# Patient Record
Sex: FEMALE | Race: WHITE | Employment: FULL TIME | URBAN - METROPOLITAN AREA
[De-identification: names, ages, dates, MRNs, and addresses within clinical notes are randomized per-mention and may not be internally consistent; named-entity substitution may affect disease eponyms.]

---

## 2019-09-17 ENCOUNTER — HOSPITAL ENCOUNTER (OUTPATIENT)
Dept: INTERVENTIONAL RADIOLOGY/VASCULAR | Age: 59
Discharge: HOME | End: 2019-09-17
Attending: RADIOLOGY | Admitting: RADIOLOGY
Payer: COMMERCIAL

## 2019-09-17 DIAGNOSIS — M54.2 NECK PAIN: ICD-10-CM

## 2019-09-17 DIAGNOSIS — M51.36 DDD (DEGENERATIVE DISC DISEASE), LUMBAR: ICD-10-CM

## 2019-09-17 DIAGNOSIS — M54.12 CERVICAL RADICULOPATHY: ICD-10-CM

## 2019-09-17 DIAGNOSIS — M54.16 LUMBAR RADICULOPATHY: ICD-10-CM

## 2019-09-17 DIAGNOSIS — M48.061 STENOSIS, SPINAL, LUMBAR: ICD-10-CM

## 2019-09-17 PROCEDURE — 74011250636 HC RX REV CODE- 250/636: Performed by: RADIOLOGY

## 2019-09-17 PROCEDURE — 62323 NJX INTERLAMINAR LMBR/SAC: CPT

## 2019-09-17 PROCEDURE — 74011000250 HC RX REV CODE- 250

## 2019-09-17 PROCEDURE — A9575 INJ GADOTERATE MEGLUMI 0.1ML: HCPCS | Performed by: RADIOLOGY

## 2019-09-17 PROCEDURE — 64483 NJX AA&/STRD TFRM EPI L/S 1: CPT

## 2019-09-17 PROCEDURE — 74011000250 HC RX REV CODE- 250: Performed by: RADIOLOGY

## 2019-09-17 RX ORDER — LIDOCAINE HYDROCHLORIDE 10 MG/ML
INJECTION, SOLUTION EPIDURAL; INFILTRATION; INTRACAUDAL; PERINEURAL
Status: DISCONTINUED
Start: 2019-09-17 | End: 2019-09-17 | Stop reason: HOSPADM

## 2019-09-17 RX ORDER — DEXAMETHASONE SODIUM PHOSPHATE 10 MG/ML
10 INJECTION INTRAMUSCULAR; INTRAVENOUS ONCE
Status: DISCONTINUED | OUTPATIENT
Start: 2019-09-17 | End: 2019-09-17 | Stop reason: HOSPADM

## 2019-09-17 RX ORDER — LIDOCAINE HYDROCHLORIDE 10 MG/ML
10 INJECTION, SOLUTION EPIDURAL; INFILTRATION; INTRACAUDAL; PERINEURAL ONCE
Status: COMPLETED | OUTPATIENT
Start: 2019-09-17 | End: 2019-09-17

## 2019-09-17 RX ORDER — SODIUM CHLORIDE 9 MG/ML
INJECTION INTRAMUSCULAR; INTRAVENOUS; SUBCUTANEOUS
Status: COMPLETED
Start: 2019-09-17 | End: 2019-09-17

## 2019-09-17 RX ORDER — GADOTERATE MEGLUMINE 376.9 MG/ML
10 INJECTION INTRAVENOUS
Status: COMPLETED | OUTPATIENT
Start: 2019-09-17 | End: 2019-09-17

## 2019-09-17 RX ORDER — DEXAMETHASONE SODIUM PHOSPHATE 10 MG/ML
10 INJECTION INTRAMUSCULAR; INTRAVENOUS ONCE
Status: COMPLETED | OUTPATIENT
Start: 2019-09-17 | End: 2019-09-17

## 2019-09-17 RX ADMIN — LIDOCAINE HYDROCHLORIDE 10 ML: 10 INJECTION, SOLUTION EPIDURAL; INFILTRATION; INTRACAUDAL; PERINEURAL at 08:10

## 2019-09-17 RX ADMIN — DEXAMETHASONE SODIUM PHOSPHATE 10 MG: 10 INJECTION INTRAMUSCULAR; INTRAVENOUS at 08:11

## 2019-09-17 RX ADMIN — GADOTERATE MEGLUMINE 10 ML: 376.9 INJECTION INTRAVENOUS at 08:10

## 2019-09-17 RX ADMIN — SODIUM CHLORIDE 10 ML: 9 INJECTION INTRAMUSCULAR; INTRAVENOUS; SUBCUTANEOUS at 08:10

## 2019-09-17 NOTE — DISCHARGE INSTRUCTIONS
[address] Steroid Injection Discharge Instructions    General Information:   A steroid injection was performed today, placing a combination of a steroid and an anesthetic (numbing medicine) into the space around the nerves of your spine. This is done to treat back pain. It may take 7-10 days for the injection to reach its full potential.  This procedure can be done at any level of the spinal column, depending on where your pain is. Your doctor will have ordered the appropriate level to be treated prior to your coming in for the procedure. Home Care Instructions: You can resume your regular diet. Do not drink alcohol. You may notice that you have to use your pain medications less after your injection. Some people do not notice much of a change in their pain after the first injection. If that is the case, it is worth your time to have a second one done. This is why these injections are sometimes ordered in a series of three. Keep the puncture site clean and dry for 24 hours, and then you may remove the dressing. Showering is acceptable after the bandage is removed. Call If:   You should call your Physician and/or the Radiology Nurse if you have any bleeding other than a small spot on your bandage. Call if you have any signs of infection, fever, increased pain at the puncture site, confusion, or a headache that worsens when you stand and eases when lying flat. Follow-Up Instructions:  Please see your ordering doctor as he/she has requested. Let your doctor know if you have relief from your pain so they may schedule another injection for you if it is indicated. To Reach Us:  Side effects of sedation medications and other medications used today have been reviewed. Notify us of nausea, itching, hives, dizziness, or anything else out of the ordinary.        Should you experience any of these significant changes, please call [phone] between the hours of 7:30 am and 10 pm or [phone] after hours.  After hours, ask the  to page the [address] Technologist, and describe the problem to the technologist.           Patient Signature:  Date: 9/17/2019  Discharging Nurse: Kelsey Daniels RN

## 2019-12-12 ENCOUNTER — HOSPITAL ENCOUNTER (OUTPATIENT)
Dept: MRI IMAGING | Age: 59
Discharge: HOME | End: 2019-12-12
Attending: ORTHOPAEDIC SURGERY
Payer: COMMERCIAL

## 2019-12-12 DIAGNOSIS — M48.061 LUMBAR STENOSIS: ICD-10-CM

## 2019-12-12 DIAGNOSIS — M54.16 LUMBAR RADICULOPATHY: ICD-10-CM

## 2019-12-12 PROCEDURE — 72148 MRI LUMBAR SPINE W/O DYE: CPT

## 2020-02-13 ENCOUNTER — HOSPITAL ENCOUNTER (OUTPATIENT)
Dept: INTERVENTIONAL RADIOLOGY/VASCULAR | Age: 60
Discharge: HOME | End: 2020-02-13
Attending: RADIOLOGY | Admitting: RADIOLOGY
Payer: COMMERCIAL

## 2020-02-13 DIAGNOSIS — M54.12 CERVICAL RADICULOPATHY: ICD-10-CM

## 2020-02-13 DIAGNOSIS — M54.16 LUMBAR RADICULOPATHY: ICD-10-CM

## 2020-02-13 DIAGNOSIS — M51.36 LUMBAR DEGENERATIVE DISC DISEASE: ICD-10-CM

## 2020-02-13 DIAGNOSIS — M48.061 LUMBAR STENOSIS: ICD-10-CM

## 2020-02-13 DIAGNOSIS — M50.30 DDD (DEGENERATIVE DISC DISEASE), CERVICAL: ICD-10-CM

## 2020-02-13 DIAGNOSIS — M54.2 NECK PAIN ON RIGHT SIDE: ICD-10-CM

## 2020-02-13 PROCEDURE — 64483 NJX AA&/STRD TFRM EPI L/S 1: CPT

## 2020-02-13 PROCEDURE — 74011636320 HC RX REV CODE- 636/320: Performed by: RADIOLOGY

## 2020-02-13 PROCEDURE — A9585 GADOBUTROL INJECTION: HCPCS | Performed by: RADIOLOGY

## 2020-02-13 PROCEDURE — 74011000250 HC RX REV CODE- 250: Performed by: RADIOLOGY

## 2020-02-13 PROCEDURE — 74011250636 HC RX REV CODE- 250/636: Performed by: RADIOLOGY

## 2020-02-13 RX ORDER — LIDOCAINE HYDROCHLORIDE 10 MG/ML
10 INJECTION, SOLUTION EPIDURAL; INFILTRATION; INTRACAUDAL; PERINEURAL
Status: COMPLETED | OUTPATIENT
Start: 2020-02-13 | End: 2020-02-13

## 2020-02-13 RX ORDER — DEXAMETHASONE SODIUM PHOSPHATE 10 MG/ML
10 INJECTION INTRAMUSCULAR; INTRAVENOUS ONCE
Status: COMPLETED | OUTPATIENT
Start: 2020-02-13 | End: 2020-02-13

## 2020-02-13 RX ORDER — SODIUM CHLORIDE 9 MG/ML
3 INJECTION INTRAMUSCULAR; INTRAVENOUS; SUBCUTANEOUS
Status: COMPLETED | OUTPATIENT
Start: 2020-02-13 | End: 2020-02-13

## 2020-02-13 RX ADMIN — LIDOCAINE HYDROCHLORIDE 5 ML: 10 INJECTION, SOLUTION EPIDURAL; INFILTRATION; INTRACAUDAL; PERINEURAL at 08:11

## 2020-02-13 RX ADMIN — SODIUM CHLORIDE 2 ML: 9 INJECTION INTRAMUSCULAR; INTRAVENOUS; SUBCUTANEOUS at 08:28

## 2020-02-13 RX ADMIN — IOHEXOL 1 ML: 180 INJECTION INTRAVENOUS at 08:28

## 2020-02-13 RX ADMIN — DEXAMETHASONE SODIUM PHOSPHATE 10 MG: 10 INJECTION, SOLUTION INTRAMUSCULAR; INTRAVENOUS at 08:10

## 2021-11-19 DIAGNOSIS — M76.62 TENDONITIS, ACHILLES, LEFT: Primary | ICD-10-CM

## 2021-11-22 DIAGNOSIS — M76.61 RIGHT ACHILLES TENDINITIS: Primary | ICD-10-CM

## 2021-12-06 ENCOUNTER — OFFICE VISIT (OUTPATIENT)
Dept: ORTHOPEDIC SURGERY | Age: 61
End: 2021-12-06
Payer: COMMERCIAL

## 2021-12-06 DIAGNOSIS — M76.60 INSERTIONAL ACHILLES TENDINOPATHY: Primary | ICD-10-CM

## 2021-12-06 PROCEDURE — 99213 OFFICE O/P EST LOW 20 MIN: CPT | Performed by: ORTHOPAEDIC SURGERY

## 2021-12-06 NOTE — PROGRESS NOTES
Moshe Garcia (: 1960) is a 63 y.o. female, patient, here for evaluation of the following chief complaint(s): Foot Pain (right foot pain)       HPI:    Patient returns to the office with recurrent discomfort of the right ankle. She is now status post MRI. Allergies   Allergen Reactions    Iodine Anaphylaxis    Adhesive Rash and Itching     BANDAIDS    Morphine Rash and Itching     spoke with pt who explained morphine produced red splotches all over not hives; ok w/ it again if needed as well as other opioids; no complications w/ Lortab, Percocet, etc.       Current Outpatient Medications   Medication Sig    estradiol (ESTRACE) 2 mg tablet Take  by mouth daily.  cyclobenzaprine (FLEXERIL) 10 mg tablet Take  by mouth three (3) times daily as needed for Muscle Spasm(s).  cetirizine (ZYRTEC) 10 mg tablet Take  by mouth daily.  oxyCODONE-acetaminophen (PERCOCET) 5-325 mg per tablet Take 1-2 Tabs by mouth every four (4) hours as needed for Pain. No current facility-administered medications for this visit.        Past Medical History:   Diagnosis Date    Arthritis     OSTEO    Chronic pain     DJD LUMBAR; SCIATICA, PAIN IN BUTTOCK ON LEFT OR RIGHT AT DIFFERENT TIMES    IBS (irritable bowel syndrome)     Mitral valve prolapse     Nausea & vomiting     RECENT PRE-OP ANTI-EMETIC EFFECTIVE        Past Surgical History:   Procedure Laterality Date    HX DILATION AND CURETTAGE      HX HEENT  13YO    STRABISMUS REPAIR    HX HYSTERECTOMY      HX ORTHOPAEDIC  13    KNEE ARTHROSCOPY LEFT    HX ORTHOPAEDIC      STAPLE IN L SHOULDER    IR INJ FORAMIN EPID LUMB ANES/STER SNGL  2020    IR INJ SPINE THER SUBST LUM/SAC W IMG  2019       Family History   Problem Relation Age of Onset    Heart Surgery Mother     Arthritis-rheumatoid Mother     Cancer Father         LUNG - SMOKER    Stroke Brother 36        CEREBRAL ANEURYSM    Other Brother         PSORIATIC ARTHRITIS    Arthritis-rheumatoid Maternal Aunt         Social History     Socioeconomic History    Marital status: SINGLE     Spouse name: Not on file    Number of children: Not on file    Years of education: Not on file    Highest education level: Not on file   Occupational History    Not on file   Tobacco Use    Smoking status: Never Smoker    Smokeless tobacco: Not on file   Substance and Sexual Activity    Alcohol use: Yes     Alcohol/week: 0.8 standard drinks     Types: 1 Glasses of wine per week    Drug use: Not on file    Sexual activity: Not on file   Other Topics Concern    Not on file   Social History Narrative    Not on file     Social Determinants of Health     Financial Resource Strain:     Difficulty of Paying Living Expenses: Not on file   Food Insecurity:     Worried About Running Out of Food in the Last Year: Not on file    Elizabeth of Food in the Last Year: Not on file   Transportation Needs:     Lack of Transportation (Medical): Not on file    Lack of Transportation (Non-Medical):  Not on file   Physical Activity:     Days of Exercise per Week: Not on file    Minutes of Exercise per Session: Not on file   Stress:     Feeling of Stress : Not on file   Social Connections:     Frequency of Communication with Friends and Family: Not on file    Frequency of Social Gatherings with Friends and Family: Not on file    Attends Cheondoism Services: Not on file    Active Member of [address] [place] [address] [employer] or Organizations: Not on file    Attends Club or Organization Meetings: Not on file    Marital Status: Not on file   Intimate Partner Violence:     Fear of Current or Ex-Partner: Not on file    Emotionally Abused: Not on file    Physically Abused: Not on file    Sexually Abused: Not on file   Housing Stability:     Unable to Pay for Housing in the Last Year: Not on file    Number of [place] in the Last Year: Not on file    Unstable Housing in the Last Year: Not on file       Review of Systems Musculoskeletal:        Right foot pain       Vitals: There were no vitals taken for this visit. There is no height or weight on file to calculate BMI. Ortho Exam     Right ankle: Patient has mild swelling along the insertion site of the Achilles tendon with pain to palpation at the site. Patient has full range of motion of the ankle and subtalar joint. There is no instability. Neurovascular examination is intact. MRI evaluation shows degenerative changes noted at the insertion site of the Achilles with evidence of partial thickness tearing along the deep portion of the Achilles    ASSESSMENT/PLAN:    I gone over the findings. She has had multiple conservative options moving forward. She is leaning more towards more aggressive options. Therefore I would recommend a referral line to Dr. Gurpreet Davis. Patient will have the MRI available and I have made the referral.  She is to follow-up as needed.         Billye Blizzard, MD

## 2022-06-29 ENCOUNTER — OFFICE VISIT (OUTPATIENT)
Dept: ORTHOPEDIC SURGERY | Age: 62
End: 2022-06-29
Payer: COMMERCIAL

## 2022-06-29 VITALS — HEIGHT: 72 IN | WEIGHT: 215 LBS | BODY MASS INDEX: 29.12 KG/M2

## 2022-06-29 DIAGNOSIS — M17.12 PRIMARY OSTEOARTHRITIS OF LEFT KNEE: ICD-10-CM

## 2022-06-29 DIAGNOSIS — M25.562 LEFT KNEE PAIN, UNSPECIFIED CHRONICITY: Primary | ICD-10-CM

## 2022-06-29 PROCEDURE — 20610 DRAIN/INJ JOINT/BURSA W/O US: CPT | Performed by: ORTHOPAEDIC SURGERY

## 2022-06-29 PROCEDURE — 99214 OFFICE O/P EST MOD 30 MIN: CPT | Performed by: ORTHOPAEDIC SURGERY

## 2022-06-29 NOTE — LETTER
6/29/2022    Patient: Jayy Avila   YOB: 1960   Date of Visit: 6/29/2022     Gordon Felipe MD  [place] [address] [address]  Via Fax: [phone]    Dear Gordon Felipe MD,      Thank you for referring Ms. Jasmina Parmar to [facility] for evaluation. My notes for this consultation are attached. If you have questions, please do not hesitate to call me. I look forward to following your patient along with you.       Sincerely,    Jj Hardwick MD

## 2022-06-29 NOTE — PROGRESS NOTES
Jamir Lundberg (: 1960) is a 63 y.o. female, patient, here for evaluation of the following chief complaint(s):  Knee Pain (left knee pain)       HPI:    Patient returns to the office today with with a chief plaint of left knee pain. This is a patient who has had on and off left knee pain. Her pain is gotten a little bit worse. She describes swelling in the knee. She notices snapping popping of the knee. She did have a prior opening wedge osteotomy performed on her distal femur on the date of 2012. She does not report any recent level of trauma. Allergies   Allergen Reactions    Iodine Anaphylaxis    Adhesive Rash and Itching     BANDAIDS    Morphine Rash and Itching     spoke with pt who explained morphine produced red splotches all over not hives; ok w/ it again if needed as well as other opioids; no complications w/ Lortab, Percocet, etc.       Current Outpatient Medications   Medication Sig    estradiol (ESTRACE) 2 mg tablet Take  by mouth daily.  cyclobenzaprine (FLEXERIL) 10 mg tablet Take  by mouth three (3) times daily as needed for Muscle Spasm(s).  cetirizine (ZYRTEC) 10 mg tablet Take  by mouth daily.  oxyCODONE-acetaminophen (PERCOCET) 5-325 mg per tablet Take 1-2 Tabs by mouth every four (4) hours as needed for Pain. No current facility-administered medications for this visit.        Past Medical History:   Diagnosis Date    Arthritis     OSTEO    Chronic pain     DJD LUMBAR; SCIATICA, PAIN IN BUTTOCK ON LEFT OR RIGHT AT DIFFERENT TIMES    IBS (irritable bowel syndrome)     Mitral valve prolapse     Nausea & vomiting     RECENT PRE-OP ANTI-EMETIC EFFECTIVE        Past Surgical History:   Procedure Laterality Date    HX DILATION AND CURETTAGE      HX HEENT  13YO    STRABISMUS REPAIR    HX HYSTERECTOMY      HX ORTHOPAEDIC  13    KNEE ARTHROSCOPY LEFT    HX ORTHOPAEDIC      STAPLE IN L SHOULDER    IR INJ FORAMIN EPID LUMB ANES/STER SNGL 2/13/2020    IR INJ SPINE THER SUBST LUM/SAC W IMG  9/17/2019       Family History   Problem Relation Age of Onset    Heart Surgery Mother     Arthritis-rheumatoid Mother     Cancer Father         LUNG - SMOKER    Stroke Brother 36        CEREBRAL ANEURYSM    Other Brother         PSORIATIC ARTHRITIS    Arthritis-rheumatoid Maternal Aunt         Social History     Socioeconomic History    Marital status: SINGLE     Spouse name: Not on file    Number of children: Not on file    Years of education: Not on file    Highest education level: Not on file   Occupational History    Not on file   Tobacco Use    Smoking status: Never Smoker    Smokeless tobacco: Not on file   Substance and Sexual Activity    Alcohol use: Yes     Alcohol/week: 0.8 standard drinks     Types: 1 Glasses of wine per week    Drug use: Not on file    Sexual activity: Not on file   Other Topics Concern    Not on file   Social History Narrative    Not on file     Social Determinants of Health     Financial Resource Strain:     Difficulty of Paying Living Expenses: Not on file   Food Insecurity:     Worried About Running Out of Food in the Last Year: Not on file    Elizabeth of Food in the Last Year: Not on file   Transportation Needs:     Lack of Transportation (Medical): Not on file    Lack of Transportation (Non-Medical):  Not on file   Physical Activity:     Days of Exercise per Week: Not on file    Minutes of Exercise per Session: Not on file   Stress:     Feeling of Stress : Not on file   Social Connections:     Frequency of Communication with Friends and Family: Not on file    Frequency of Social Gatherings with Friends and Family: Not on file    Attends Confucianist Services: Not on file    Active Member of Clubs or Organizations: Not on file    Attends Club or Organization Meetings: Not on file    Marital Status: Not on file   Intimate Partner Violence:     Fear of Current or Ex-Partner: Not on file   [employer] Abused: Not on file    Physically Abused: Not on file    Sexually Abused: Not on file   Housing Stability:     Unable to Pay for Housing in the Last Year: Not on file    Number of Places Lived in the Last Year: Not on file    Unstable Housing in the Last Year: Not on file       Review of Systems   Musculoskeletal:        Left knee pain       Vitals:  Ht 6' 3\" (1.905 m)   Wt 215 lb (97.5 kg)   BMI 26.87 kg/m²    Body mass index is 26.87 kg/m². Ortho Exam     Patient is alert and oriented x3. Patient is in no acute distress. Patient ambulates with a nonantalgic gait. Left knee: Prior incision is healed. She has moderate effusion. Range of motion 0-135 degrees. She has crepitation along the patellofemoral joint as well as lateral joint space. She has tenderness noted along the lateral joint space. No medial joint tenderness. Collateral ligaments are intact. Anterior drawer and posterior drawer negative. There is no swelling noted distally. Neurovascular examination is intact. Right knee: There is no abrasions, lacerations, ecchymosis or soft tissue swelling. No effusion is identified. There is no pain to palpation along the medial or lateral border of the patella. There is no pain or crepitation with manipulation of the patella. There is normal excursion of the patella. Patellar grind test is negative. Active and passive range of motion is full and does not cause pain or crepitation. There is no pain with palpation along the medial femoral epicondyle or medial tibia and no pain with palpation over the lateral femoral epicondyle. There is no medial or lateral joint line tenderness. Julita's maneuver is negative. There is no collateral ligament instability. Anterior drawer, Lachman and posterior drawer are negative. There is no soft tissue swelling distally into the leg. Neurocirculatory examination is intact.       XR Results (most recent):  Results from Appointment encounter on 06/29/22    XR KNEE LT MIN 4 V    Narrative  5 view x-ray of the left knee reveals bone-on-bone arthritis lateral compartment of the left knee. She has retained hardware from prior osteotomy. Moderate osteoarthritis of the patellofemoral joint is also noted. ASSESSMENT/PLAN:    I gone over the findings with the patient. Patient has developed worsening osteoarthritis and now bone-on-bone arthritis lateral compartment of the left knee. We have talked about treatment options moving forward. We did talk about converting to a total knee replacement. We also talked about a partial knee replacement. I think a total knee replacement would be 1 and done as well compared to a partial knee replacement and very well may provide some level of improvement but over time she most likely will have a third surgery to convert this she was given this some consideration. However at this stage she would like to continue with nonoperative treatment. We have decided to proceed with aspiration injection of the knee. I think this is appropriate and reasonable. If she has no improvement with aspiration injection, we may need to consider more aggressive options later. Consent for the injection was obtained. Risk of postinjection infection, lack of improvement, hypopigmentation and unusual allergic reaction were explained to the patient. After consent, the skin was sterilely prepped and 80 mg of triamcinolone and 5 cc of 0.25% plain Marcaine was was injected in the left knee. Knee was aspirated approximately 50 cc of sterile fluid patient had no complications. Patient is to ice modify activities for 24 hours.   Patient is to return to the office if no improvement        Nallely Love MD

## 2022-09-28 ENCOUNTER — OFFICE VISIT (OUTPATIENT)
Dept: ORTHOPEDIC SURGERY | Age: 62
End: 2022-09-28
Payer: COMMERCIAL

## 2022-09-28 VITALS — BODY MASS INDEX: 27.9 KG/M2 | HEIGHT: 72 IN | WEIGHT: 206 LBS

## 2022-09-28 DIAGNOSIS — M25.562 LEFT KNEE PAIN, UNSPECIFIED CHRONICITY: Primary | ICD-10-CM

## 2022-09-28 DIAGNOSIS — M17.12 PRIMARY OSTEOARTHRITIS OF LEFT KNEE: ICD-10-CM

## 2022-09-28 PROBLEM — R20.9 SKIN SENSATION DISTURBANCE: Status: ACTIVE | Noted: 2022-09-28

## 2022-09-28 PROBLEM — M95.0 NASAL DEVIATION: Status: ACTIVE | Noted: 2022-09-28

## 2022-09-28 PROBLEM — M67.88 ACHILLES TENDINOSIS OF RIGHT LOWER EXTREMITY: Status: ACTIVE | Noted: 2022-01-07

## 2022-09-28 PROBLEM — R03.0 ELEVATED BLOOD-PRESSURE READING WITHOUT DIAGNOSIS OF HYPERTENSION: Status: ACTIVE | Noted: 2022-09-28

## 2022-09-28 PROBLEM — J32.9 CHRONIC SINUSITIS: Status: ACTIVE | Noted: 2022-09-28

## 2022-09-28 PROBLEM — M19.019 LOCALIZED, PRIMARY OSTEOARTHRITIS OF SHOULDER REGION: Status: ACTIVE | Noted: 2022-09-28

## 2022-09-28 PROBLEM — G57.00 SCIATIC NERVE LESION: Status: ACTIVE | Noted: 2022-09-28

## 2022-09-28 PROBLEM — K58.9 IRRITABLE BOWEL SYNDROME: Status: ACTIVE | Noted: 2022-09-28

## 2022-09-28 PROBLEM — I10 BENIGN ESSENTIAL HYPERTENSION: Status: ACTIVE | Noted: 2022-09-28

## 2022-09-28 PROBLEM — I05.9 MITRAL VALVE DISORDER: Status: ACTIVE | Noted: 2022-09-28

## 2022-09-28 PROBLEM — R43.0 LOSS OF SMELL: Status: ACTIVE | Noted: 2022-09-28

## 2022-09-28 PROBLEM — J30.2 SEASONAL ALLERGIES: Status: ACTIVE | Noted: 2022-09-28

## 2022-09-28 PROBLEM — M17.9 OSTEOARTHRITIS OF KNEE: Status: ACTIVE | Noted: 2022-09-28

## 2022-09-28 PROBLEM — R06.83 SNORING: Status: ACTIVE | Noted: 2022-09-28

## 2022-09-28 PROBLEM — M47.816 LUMBAR SPONDYLOSIS: Status: ACTIVE | Noted: 2022-09-28

## 2022-09-28 PROBLEM — F41.1 GENERALIZED ANXIETY DISORDER: Status: ACTIVE | Noted: 2022-09-28

## 2022-09-28 PROCEDURE — 99203 OFFICE O/P NEW LOW 30 MIN: CPT | Performed by: ORTHOPAEDIC SURGERY

## 2022-09-28 RX ORDER — ASPIRIN 325 MG
325 TABLET ORAL DAILY
COMMUNITY

## 2022-09-28 RX ORDER — DULOXETIN HYDROCHLORIDE 30 MG/1
30 CAPSULE, DELAYED RELEASE ORAL DAILY
COMMUNITY

## 2022-09-28 RX ORDER — LOSARTAN POTASSIUM 50 MG/1
TABLET ORAL
COMMUNITY
Start: 2022-09-12

## 2022-09-28 RX ORDER — DICLOFENAC SODIUM 10 MG/G
GEL TOPICAL
COMMUNITY

## 2022-09-28 RX ORDER — ESTRADIOL 1 MG/1
TABLET ORAL
COMMUNITY
Start: 2022-09-25

## 2022-09-28 NOTE — PATIENT INSTRUCTIONS
Knee Arthritis: Care Instructions  Your Care Instructions     Knee arthritis is a breakdown of the cartilage that cushions your knee joint. When the cartilage wears down, your bones rub against each other. This causes pain and stiffness. Knee arthritis tends to get worse with time. Treatment for knee arthritis involves reducing pain, making the leg muscles stronger, and staying at a healthy body weight. The treatment usually does not improve the health of the cartilage, but it can reduce pain and improve how well your knee works. You can take simple measures to protect your knee joints, ease your pain, and help you stay active. Follow-up care is a key part of your treatment and safety. Be sure to make and go to all appointments, and call your doctor if you are having problems. It's also a good idea to know your test results and keep a list of the medicines you take. How can you care for yourself at home? Know that knee arthritis will cause more pain on some days than on others. Stay at a healthy weight. Lose weight if you are overweight. When you stand up, the pressure on your knees from every pound of body weight is multiplied four times. So if you lose 10 pounds, you will reduce the pressure on your knees by 40 pounds. Talk to your doctor or physical therapist about exercises that will help ease joint pain. Stretch to help prevent stiffness and to prevent injury before you exercise. You may enjoy gentle forms of yoga to help keep your knee joints and muscles flexible. Walk instead of jog. Ride a bike. This makes your thigh muscles stronger and takes pressure off your knee. Wear well-fitting and comfortable shoes. Exercise in chest-deep water. This can help you exercise longer with less pain. Avoid exercises that include squatting or kneeling. They can put a lot of strain on your knees. Talk to your doctor to make sure that the exercise you do is not making the arthritis worse.   Do not sit for long periods of time. Try to walk once in a while to keep your knee from getting stiff. Ask your doctor or physical therapist whether shoe inserts may reduce your arthritis pain. If you can afford it, get new athletic shoes at least every year. This can help reduce the strain on your knees. Use a device to help you do everyday activities. A cane or walking stick can help you keep your balance when you walk. Hold the cane or walking stick in the hand opposite the painful knee. If you feel like you may fall when you walk, try using crutches or a front-wheeled walker. These can prevent falls that could cause more damage to your knee. A knee brace may help keep your knee stable and prevent pain. You also can use other things to make life easier, such as a higher toilet seat and handrails in the bathtub or shower. Take pain medicines exactly as directed. Do not wait until you are in severe pain. You will get better results if you take it sooner. If you are not taking a prescription pain medicine, take an over-the-counter medicine such as acetaminophen (Tylenol), ibuprofen (Advil, Motrin), or naproxen (Aleve). Read and follow all instructions on the label. Do not take two or more pain medicines at the same time unless the doctor told you to. Many pain medicines have acetaminophen, which is Tylenol. Too much acetaminophen (Tylenol) can be harmful. Tell your doctor if you take a blood thinner, have diabetes, or have allergies to shellfish. Ask your doctor if you might benefit from a shot of steroid medicine into your knee. This may provide pain relief for several months. Many people take the supplements glucosamine and chondroitin for osteoarthritis. Some people feel they help, but the medical research does not show that they work. Talk to your doctor before you take these supplements. When should you call for help?    Call your doctor now or seek immediate medical care if:    You have sudden swelling, warmth, or pain in your knee. You have knee pain and a fever or rash. You have such bad pain that you cannot use your knee. Watch closely for changes in your health, and be sure to contact your doctor if you have any problems. Where can you learn more? Go to http://www.gray.com/  Enter W187 in the search box to learn more about \"Knee Arthritis: Care Instructions. \"  Current as of: December 20, 2021               Content Version: 13.2  © 2006-2022 [employer]. Care instructions adapted under license by [employer] (which disclaims liability or warranty for this information). If you have questions about a medical condition or this instruction, always ask your healthcare professional. [address] any warranty or liability for your use of this information.

## 2022-09-28 NOTE — LETTER
10/3/2022    Patient: Leno Brice   YOB: 1960   Date of Visit: 9/28/2022     Trina Escobedo MD  [address]  [address] [address]  Via Fax: [phone]    Dear Trina Escobedo MD,      Thank you for referring Ms. Lizz Bennett to [address] [place] [address] AND [facility] for evaluation. My notes for this consultation are attached. If you have questions, please do not hesitate to call me. I look forward to following your patient along with you.       Sincerely,    Erica Molina MD

## 2022-09-28 NOTE — PROGRESS NOTES
Name: Marimar Rawls    : 1960     Service Dept: [address] [place] [address] and Sports Medicine    Chief Complaint   Patient presents with    Knee Pain        Visit Vitals  Ht 6' 3\" (1.905 m)   Wt 206 lb (93.4 kg)   BMI 25.75 kg/m²        Allergies   Allergen Reactions    Iodine Anaphylaxis    Shellfish Derived Anaphylaxis    Adhesive Rash and Itching     BANDAIDS    Morphine Rash, Itching and Hives     spoke with pt who explained morphine produced red splotches all over not hives; ok w/ it again if needed as well as other opioids; no complications w/ Lortab, Percocet, etc.  Other reaction(s): hives        Current Outpatient Medications   Medication Sig Dispense Refill    aspirin (ASPIRIN) 325 mg tablet Take 325 mg by mouth daily. diclofenac (VOLTAREN) 1 % gel apply 4 grams to affected area as needed      DULoxetine (CYMBALTA) 30 mg capsule Take 30 mg by mouth daily. losartan (COZAAR) 50 mg tablet TAKE 1 TABLET BY MOUTH EVERY DAY FOR 30 DAYS      estradioL (ESTRACE) 1 mg tablet       cyclobenzaprine (FLEXERIL) 10 mg tablet Take  by mouth three (3) times daily as needed for Muscle Spasm(s). cetirizine (ZYRTEC) 10 mg tablet Take  by mouth daily. oxyCODONE-acetaminophen (PERCOCET) 5-325 mg per tablet Take 1-2 Tabs by mouth every four (4) hours as needed for Pain.  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    Osteoarthritis of knee M17.10    Nasal deviation M95.0    Mitral valve disorder I05.9    Snoring R06.83    Skin sensation disturbance R20.9      Family History   Problem Relation Age of Onset    Heart Surgery Mother     Arthritis-rheumatoid Mother     Cancer Father         LUNG - SMOKER    Stroke Brother 36        CEREBRAL ANEURYSM    Other Brother         PSORIATIC ARTHRITIS    Arthritis-rheumatoid Maternal Aunt       Social History     Socioeconomic History    Marital status: SINGLE   Tobacco Use    Smoking status: Never    Smokeless tobacco: Never   Substance and Sexual Activity    Alcohol use: Yes     Alcohol/week: 0.8 standard drinks     Types: 1 Glasses of wine per week      Past Surgical History:   Procedure Laterality Date    HX DILATION AND CURETTAGE      HX HEENT  15YO    STRABISMUS REPAIR    HX HYSTERECTOMY  2000    HX ORTHOPAEDIC  12/5/11    KNEE ARTHROSCOPY LEFT    HX ORTHOPAEDIC      STAPLE IN L SHOULDER    IR INJ FORAMIN EPID LUMB ANES/STER SNGL  2/13/2020    IR INJ SPINE THER SUBST LUM/SAC W IMG  9/17/2019      Past Medical History:   Diagnosis Date    Arthritis     OSTEO    Chronic pain     DJD LUMBAR; SCIATICA, PAIN IN BUTTOCK ON LEFT OR RIGHT AT DIFFERENT TIMES    IBS (irritable bowel syndrome)     Mitral valve prolapse     Nausea & vomiting     RECENT PRE-OP ANTI-EMETIC EFFECTIVE        I have reviewed and agree with [address] [place] [address] Nw and ROS and intake form in chart and the record furthermore I have reviewed prior medical record(s) regarding this patients care during this appointment. Review of Systems:  Patient is a pleasant appearing individual, appropriately dressed, well hydrated, well nourished, who is alert, appropriately oriented for age, and in no acute distress with a normal gait and normal affect who does not appear to be in any significant pain.      Physical Exam:  Left Knee -Decrease range of motion with flexion, Knee arc of greater than 50 degrees, Some crepitation, Grossly neurovascularly intact, Good cap refill, No skin lesion, Moderate swelling, some gross instability, Some quadriceps weakness, Kellgren and Codey at least grade 3    Right Knee - Full Range of Motion, No crepitation, Grossly neurovascularly intact, Good cap refill, No skin lesion, No swelling, No gross instability, No quadriceps weakness     Encounter Diagnoses     ICD-10-CM ICD-9-CM   1. Left knee pain, unspecified chronicity  M25.562 719.46   2. Primary osteoarthritis of left knee  M17.12 715.16       HPI:  The patient is here with a chief complaint of left knee pain, throbbing, burning pain. Pain could be significant at times. X-rays are positive for severe OA with a lateral plate from a distal femoral osteotomy for corrective alignment. She has got multiple distal screws and a four-hole plate going proximally. Assessment/Plan:  Plan, if she decides will be for distal screw removal and retention of the plate if at all possible with a [employer] knee for just the distal femoral side for cut purposes. I did tell her we may have to remove the entire plate. X-rays were done within the Epic system so we should be able to pull up the images as well. General medical clearance. No history of blood clots. No cardiac history if she decides and go from there. As part of continued conservative pain management options the patient was advised to utilize Tylenol or OTC NSAIDS as long as it is not medically contraindicated. Return to Office: Follow-up and Dispositions    Return if symptoms worsen or fail to improve. Scribed by Trini Jeffrey as dictated by Fady Jacome. Mario Hawley MD.  Documentation True and Accepted Sourav Hawley MD

## 2022-11-08 DIAGNOSIS — M25.562 LEFT KNEE PAIN, UNSPECIFIED CHRONICITY: Primary | ICD-10-CM

## 2022-11-08 DIAGNOSIS — M17.12 PRIMARY OSTEOARTHRITIS OF LEFT KNEE: ICD-10-CM

## 2022-11-28 ENCOUNTER — OFFICE VISIT (OUTPATIENT)
Dept: ORTHOPEDIC SURGERY | Age: 62
End: 2022-11-28
Payer: COMMERCIAL

## 2022-11-28 DIAGNOSIS — M12.562 TRAUMATIC ARTHRITIS OF KNEE, LEFT: Primary | ICD-10-CM

## 2022-11-28 PROCEDURE — 99214 OFFICE O/P EST MOD 30 MIN: CPT | Performed by: ORTHOPAEDIC SURGERY

## 2022-11-28 NOTE — PROGRESS NOTES
Sammie Madrid (: 1960) is a 63 y.o. female, patient, here for evaluation of the following chief complaint(s):  Knee Pain (Left knee pain)       HPI:    Patient presents the office for evaluation of ongoing left knee pain. This patient well-known to my office. She and I have discussed total knee replacement with her in the past.  She is here today to discuss further options. Allergies   Allergen Reactions    Iodine Anaphylaxis    Shellfish Derived Anaphylaxis    Adhesive Rash and Itching     BANDAIDS    Morphine Rash, Itching and Hives     spoke with pt who explained morphine produced red splotches all over not hives; ok w/ it again if needed as well as other opioids; no complications w/ Lortab, Percocet, etc.  Other reaction(s): hives       Current Outpatient Medications   Medication Sig    aspirin (ASPIRIN) 325 mg tablet Take 325 mg by mouth daily. diclofenac (VOLTAREN) 1 % gel apply 4 grams to affected area as needed    DULoxetine (CYMBALTA) 30 mg capsule Take 30 mg by mouth daily. losartan (COZAAR) 50 mg tablet TAKE 1 TABLET BY MOUTH EVERY DAY FOR 30 DAYS    estradioL (ESTRACE) 1 mg tablet     cyclobenzaprine (FLEXERIL) 10 mg tablet Take  by mouth three (3) times daily as needed for Muscle Spasm(s). cetirizine (ZYRTEC) 10 mg tablet Take  by mouth daily. oxyCODONE-acetaminophen (PERCOCET) 5-325 mg per tablet Take 1-2 Tabs by mouth every four (4) hours as needed for Pain. No current facility-administered medications for this visit.        Past Medical History:   Diagnosis Date    Arthritis     OSTEO    Chronic pain     DJD LUMBAR; SCIATICA, PAIN IN BUTTOCK ON LEFT OR RIGHT AT DIFFERENT TIMES    IBS (irritable bowel syndrome)     Mitral valve prolapse     Nausea & vomiting     RECENT PRE-OP ANTI-EMETIC EFFECTIVE        Past Surgical History:   Procedure Laterality Date    HX DILATION AND CURETTAGE      HX HEENT  15YO    STRABISMUS REPAIR    HX HYSTERECTOMY      HX ORTHOPAEDIC 12/5/11    KNEE ARTHROSCOPY LEFT    HX ORTHOPAEDIC      STAPLE IN L SHOULDER    IR INJ FORAMIN EPID LUMB ANES/STER SNGL  2/13/2020    IR INJ SPINE THER SUBST LUM/SAC W IMG  9/17/2019       Family History   Problem Relation Age of Onset    Heart Surgery Mother     Arthritis-rheumatoid Mother     Cancer Father         LUNG - SMOKER    Stroke Brother 36        CEREBRAL ANEURYSM    Other Brother         PSORIATIC ARTHRITIS    Arthritis-rheumatoid Maternal Aunt         Social History     Socioeconomic History    Marital status: SINGLE     Spouse name: Not on file    Number of children: Not on file    Years of education: Not on file    Highest education level: Not on file   Occupational History    Not on file   Tobacco Use    Smoking status: Never    Smokeless tobacco: Never   Substance and Sexual Activity    Alcohol use: Yes     Alcohol/week: 0.8 standard drinks     Types: 1 Glasses of wine per week    Drug use: Not on file    Sexual activity: Not on file   Other Topics Concern    Not on file   Social History Narrative    Not on file     Social Determinants of Health     Financial Resource Strain: Not on file   Food Insecurity: Not on file   Transportation Needs: Not on file   Physical Activity: Not on file   Stress: Not on file   Social Connections: Not on file   Intimate Partner Violence: Not on file   Housing Stability: Not on file       Review of Systems   Musculoskeletal:         Left knee pain      Vitals: There were no vitals taken for this visit. There is no height or weight on file to calculate BMI. Ortho Exam       Patient is alert and oriented x3. Patient is in no acute distress. Patient ambulates with a nonantalgic gait. Left knee: Prior incision is healed. She has moderate effusion. Range of motion 0-135 degrees. She has crepitation along the patellofemoral joint as well as lateral joint space. She has tenderness noted along the lateral joint space. No medial joint tenderness.   Collateral ligaments are intact. Anterior drawer and posterior drawer negative. There is no swelling noted distally. Neurovascular examination is intact. Right knee: There is no abrasions, lacerations, ecchymosis or soft tissue swelling. No effusion is identified. There is no pain to palpation along the medial or lateral border of the patella. There is no pain or crepitation with manipulation of the patella. There is normal excursion of the patella. Patellar grind test is negative. Active and passive range of motion is full and does not cause pain or crepitation. There is no pain with palpation along the medial femoral epicondyle or medial tibia and no pain with palpation over the lateral femoral epicondyle. There is no medial or lateral joint line tenderness. Julita's maneuver is negative. There is no collateral ligament instability. Anterior drawer, Lachman and posterior drawer are negative. There is no soft tissue swelling distally into the leg. Neurocirculatory examination is intact. XR Results (most recent):  Results from Appointment encounter on 06/29/22     XR KNEE LT MIN 4 V     Narrative  5 view x-ray of the left knee reveals bone-on-bone arthritis lateral compartment of the left knee. She has retained hardware from prior osteotomy. Moderate osteoarthritis of the patellofemoral joint is also noted. ASSESSMENT/PLAN:    I gone over the findings with the patient. We discussed options. She is here today to discuss total knee replacement. I think it is reasonable and appropriate. I have gone over the surgical technique once again with the patient. We have talked about her lateral plate. For the most part, because of navigation, we should be able to leave the hardware in place. However, potentially the distal screws may need to be removed. However, I do not believe the entire plate will need removal.  I discussed the surgery in great detail in regards to total knee replacement. We talked about the realignment of the knee as well as the resurfacing of the knee. Have gone over the recovery. We have discussed the risks and benefits. She would like to proceed with surgery in the near future. The risks and benefits were described to the patient. The patient understands there is a risk of infection, postoperative pain, numbness, tingling, stiffness DVT, PE, MI, CVA and any other unforeseen events. The patient also understands there is a long rehabilitative process that typically follows the surgical procedure. We talked about the possibility of not being able to alleviate all of the discomfort. Also, I explained  there is no guarantee all function and strength will return. The patient understands the possiblity that  implants may be utilized during this surgery. The patient also understands the generalized, associated risk of anesthetic and wishes to proceed in an elective fashion.           Althea Long MD

## 2022-11-28 NOTE — LETTER
11/28/2022    Patient: Rajat Tompkins   YOB: 1960   Date of Visit: 11/28/2022     Bk Modi MD  [address] [place] [id number]  Via Fax: [phone]    Dear Bk Modi MD,      Thank you for referring Ms. Saran Marsh to [facility] for evaluation. My notes for this consultation are attached. If you have questions, please do not hesitate to call me. I look forward to following your patient along with you.       Sincerely,    Amy Mortensen MD

## 2023-01-05 DIAGNOSIS — M12.562 TRAUMATIC ARTHRITIS OF KNEE, LEFT: Primary | ICD-10-CM

## 2023-01-06 ENCOUNTER — OFFICE VISIT (OUTPATIENT)
Dept: ORTHOPEDIC SURGERY | Age: 63
End: 2023-01-06
Payer: COMMERCIAL

## 2023-01-06 DIAGNOSIS — M17.12 PRIMARY OSTEOARTHRITIS OF LEFT KNEE: ICD-10-CM

## 2023-01-06 DIAGNOSIS — R26.2 DIFFICULTY WALKING: ICD-10-CM

## 2023-01-06 DIAGNOSIS — M25.562 LEFT KNEE PAIN, UNSPECIFIED CHRONICITY: Primary | ICD-10-CM

## 2023-01-06 NOTE — PROGRESS NOTES
PREOP TKR TREATMENT NOTE    Patient Name: Ijeoma Jefferson  Date:2023  : 1960  [x]  Patient  Verified  Payor: Teri Hurtado / Plan: Aris Luis / Product Type: HMO /    Total Treatment Time (min): 20  Total Timed Codes (min): 20  Referring Physician:   Felisa Simpson MD       1. Left knee pain, unspecified chronicity  2. Difficulty walking  3. Primary osteoarthritis of left knee      Treatment Area: Left knee    SUBJECTIVE  Patient is a 82-year-old female referred to physical therapy by Dr. Nohemy Martin for left preop TKR consult. Surgery scheduled for . She feels ready for surgery. Of note she has a history of left knee osteotomy performed 11 years ago. She has crutches, rolling walker, and a cane at home. She is planning to have home health PT after surgery. She lives in a multilevel home with her partner, bedroom not on the first floor. She does not however have any steps to get into the house. She works in [employer] and has the ability to work from home. She reports she will be very compliant with her home exercises afterwards. OBJECTIVE  Observations: Ambulates with non antalgic gait    Transfers: Transfers easily    Range of motion: 30 degrees of hyperextension to approximately 130 degrees of flexion, mild tightness reported at endrange    Strength: Good isometric quad contraction    Treatment:    Gait training with both walker and crutches as well as on stairs involving hand-held assist. Provided patient with written/pictorial home exercise program for range of motion, quad activation, and DVT prevention. All questions answered    ASSESSMENT    Patient presents with impairments related to range of motion, strength, impaired ability to ambulate, negotiate stairs, perform ADLs and participate in desired activities second to left knee OA. TKR scheduled for . She will benefit from PT to address above limitations and maximize function. Goals    1.  Patient will demonstrate compliance with home exercise program.    PLAN    Patient plans to receive home health PT shortly after surgery. We will gladly see the back for outpatient PT afterwards. Angelia Sánchez, PT 1/6/2023  12:02 PM    The referring physician has reviewed and approved this evaluation and plan of care as noted by the electronic signature attached to note.

## 2023-01-10 DIAGNOSIS — Z96.652 HISTORY OF TOTAL KNEE ARTHROPLASTY, LEFT: Primary | ICD-10-CM

## 2023-01-10 RX ORDER — WARFARIN 2 MG/1
2 TABLET ORAL DAILY
Qty: 30 TABLET | Refills: 3 | Status: SHIPPED | OUTPATIENT
Start: 2023-01-10

## 2023-01-11 RX ORDER — OXYCODONE HYDROCHLORIDE 5 MG/1
5 TABLET ORAL
Qty: 30 TABLET | Refills: 0 | Status: SHIPPED | OUTPATIENT
Start: 2023-01-11 | End: 2023-01-16

## 2023-01-20 DIAGNOSIS — Z96.659 HISTORY OF TOTAL KNEE REPLACEMENT, UNSPECIFIED LATERALITY: Primary | ICD-10-CM

## 2023-01-20 RX ORDER — OXYCODONE HYDROCHLORIDE 5 MG/1
5 TABLET ORAL
Qty: 30 TABLET | Refills: 0 | Status: SHIPPED | OUTPATIENT
Start: 2023-01-20 | End: 2023-01-25

## 2023-01-23 ENCOUNTER — OFFICE VISIT (OUTPATIENT)
Dept: ORTHOPEDIC SURGERY | Age: 63
End: 2023-01-23
Payer: COMMERCIAL

## 2023-01-23 DIAGNOSIS — Z96.652 HISTORY OF TOTAL KNEE ARTHROPLASTY, LEFT: Primary | ICD-10-CM

## 2023-01-23 PROCEDURE — 99024 POSTOP FOLLOW-UP VISIT: CPT | Performed by: ORTHOPAEDIC SURGERY

## 2023-01-23 NOTE — LETTER
1/23/2023    Patient: Surendra Perkins   YOB: 1960   Date of Visit: 1/23/2023     Isael Mosley MD  [place] [address] [address]  Via Fax: [phone]    Dear Isael Mosley MD,      Thank you for referring Ms. Carmelo Lindquist to [facility] for evaluation. My notes for this consultation are attached. If you have questions, please do not hesitate to call me. I look forward to following your patient along with you.       Sincerely,    Ale Toro MD

## 2023-01-23 NOTE — PROGRESS NOTES
Leda Cosby (: 1960) is a 63 y.o. female, patient, here for evaluation of the following chief complaint(s):  Knee Pain (Left total knee )       HPI:    Patient returns to the office status post total knee replacement of the left. Patient is doing well. Denies shortness of breath or calf pain. Allergies   Allergen Reactions    Iodine Anaphylaxis    Shellfish Derived Anaphylaxis    Adhesive Rash and Itching     BANDAIDS    Morphine Rash, Itching and Hives     spoke with pt who explained morphine produced red splotches all over not hives; ok w/ it again if needed as well as other opioids; no complications w/ Lortab, Percocet, etc.  Other reaction(s): hives       Current Outpatient Medications   Medication Sig    oxyCODONE IR (ROXICODONE) 5 mg immediate release tablet Take 1 Tablet by mouth every four (4) hours as needed for Pain for up to 5 days. Max Daily Amount: 30 mg.    warfarin (COUMADIN) 2 mg tablet Take 1 Tablet by mouth daily. aspirin (ASPIRIN) 325 mg tablet Take 325 mg by mouth daily. diclofenac (VOLTAREN) 1 % gel apply 4 grams to affected area as needed    DULoxetine (CYMBALTA) 30 mg capsule Take 30 mg by mouth daily. losartan (COZAAR) 50 mg tablet TAKE 1 TABLET BY MOUTH EVERY DAY FOR 30 DAYS    estradioL (ESTRACE) 1 mg tablet     cyclobenzaprine (FLEXERIL) 10 mg tablet Take  by mouth three (3) times daily as needed for Muscle Spasm(s). cetirizine (ZYRTEC) 10 mg tablet Take  by mouth daily. oxyCODONE-acetaminophen (PERCOCET) 5-325 mg per tablet Take 1-2 Tabs by mouth every four (4) hours as needed for Pain. No current facility-administered medications for this visit.        Past Medical History:   Diagnosis Date    Arthritis     OSTEO    Chronic pain     DJD LUMBAR; SCIATICA, PAIN IN BUTTOCK ON LEFT OR RIGHT AT DIFFERENT TIMES    IBS (irritable bowel syndrome)     Mitral valve prolapse     Nausea & vomiting     RECENT PRE-OP ANTI-EMETIC EFFECTIVE        Past Surgical History:   Procedure Laterality Date    HX DILATION AND CURETTAGE      HX HEENT  13YO    STRABISMUS REPAIR    HX HYSTERECTOMY  2000    HX ORTHOPAEDIC  12/5/11    KNEE ARTHROSCOPY LEFT    HX ORTHOPAEDIC      STAPLE IN L SHOULDER    IR INJ FORAMIN EPID LUMB ANES/STER SNGL  2/13/2020    IR INJ SPINE THER SUBST LUM/SAC W IMG  9/17/2019       Family History   Problem Relation Age of Onset    Heart Surgery Mother     Arthritis-rheumatoid Mother     Cancer Father         LUNG - SMOKER    Stroke Brother 36        CEREBRAL ANEURYSM    Other Brother         PSORIATIC ARTHRITIS    Arthritis-rheumatoid Maternal Aunt         Social History     Socioeconomic History    Marital status: SINGLE     Spouse name: Not on file    Number of children: Not on file    Years of education: Not on file    Highest education level: Not on file   Occupational History    Not on file   Tobacco Use    Smoking status: Never    Smokeless tobacco: Never   Substance and Sexual Activity    Alcohol use: Yes     Alcohol/week: 0.8 standard drinks     Types: 1 Glasses of wine per week    Drug use: Not on file    Sexual activity: Not on file   Other Topics Concern    Not on file   Social History Narrative    Not on file     Social Determinants of Health     Financial Resource Strain: Not on file   Food Insecurity: Not on file   Transportation Needs: Not on file   Physical Activity: Not on file   Stress: Not on file   Social Connections: Not on file   Intimate Partner Violence: Not on file   Housing Stability: Not on file       Review of Systems   Musculoskeletal:         Left total knee      Vitals: There were no vitals taken for this visit. There is no height or weight on file to calculate BMI. Ortho Exam     Left knee: Incision is healing well. She has a small effusion. Range of motion +5-80 degrees. She has mild bruising along the thigh secondary to tourniquet. Calf is soft and supple. Neurovascular examinations intact.     XR Results (most recent):  Results from Appointment encounter on 01/23/23    XR KNEE LT 3 V    Narrative  Three-view x-ray of the right knee reveals a well-seated prosthesis no lucencies identified. ASSESSMENT/PLAN:    Patient is doing well. Have gone over management of her incision. She was provided a prescription to migrate into outpatient physical therapy. She has another 2 and half to 3 weeks of Coumadin treatment for DVT T prophylaxis.   She is to return to the office in 4 weeks        Domenic Bull MD

## 2023-01-31 ENCOUNTER — HOSPITAL ENCOUNTER (EMERGENCY)
Age: 63
Discharge: HOME | End: 2023-01-31
Attending: EMERGENCY MEDICINE
Payer: COMMERCIAL

## 2023-01-31 ENCOUNTER — APPOINTMENT (OUTPATIENT)
Dept: GENERAL RADIOLOGY | Age: 63
End: 2023-01-31
Attending: EMERGENCY MEDICINE
Payer: COMMERCIAL

## 2023-01-31 ENCOUNTER — APPOINTMENT (OUTPATIENT)
Dept: VASCULAR SURGERY | Age: 63
End: 2023-01-31
Attending: EMERGENCY MEDICINE
Payer: COMMERCIAL

## 2023-01-31 VITALS
WEIGHT: 200.9 LBS | SYSTOLIC BLOOD PRESSURE: 156 MMHG | OXYGEN SATURATION: 97 % | TEMPERATURE: 97.8 F | RESPIRATION RATE: 19 BRPM | HEART RATE: 95 BPM | HEIGHT: 72 IN | DIASTOLIC BLOOD PRESSURE: 86 MMHG | BODY MASS INDEX: 27.21 KG/M2

## 2023-01-31 DIAGNOSIS — M25.562 ACUTE PAIN OF LEFT KNEE: Primary | ICD-10-CM

## 2023-01-31 DIAGNOSIS — M25.462 EFFUSION OF LEFT KNEE: ICD-10-CM

## 2023-01-31 DIAGNOSIS — R79.1 SUBTHERAPEUTIC INTERNATIONAL NORMALIZED RATIO (INR): ICD-10-CM

## 2023-01-31 LAB
ANION GAP SERPL CALC-SCNC: 7 MMOL/L (ref 5–15)
BASOPHILS # BLD: 0.1 K/UL (ref 0–0.1)
BASOPHILS NFR BLD: 1 % (ref 0–1)
BUN SERPL-MCNC: 12 MG/DL (ref 6–20)
BUN/CREAT SERPL: 17 (ref 12–20)
CALCIUM SERPL-MCNC: 9.4 MG/DL (ref 8.5–10.1)
CHLORIDE SERPL-SCNC: 99 MMOL/L (ref 97–108)
CO2 SERPL-SCNC: 27 MMOL/L (ref 21–32)
COMMENT, HOLDF: NORMAL
CREAT SERPL-MCNC: 0.7 MG/DL (ref 0.55–1.02)
DIFFERENTIAL METHOD BLD: ABNORMAL
EOSINOPHIL # BLD: 0.1 K/UL (ref 0–0.4)
EOSINOPHIL NFR BLD: 1 % (ref 0–7)
ERYTHROCYTE [DISTWIDTH] IN BLOOD BY AUTOMATED COUNT: 12.6 % (ref 11.5–14.5)
GLUCOSE SERPL-MCNC: 113 MG/DL (ref 65–100)
HCT VFR BLD AUTO: 30.2 % (ref 35–47)
HGB BLD-MCNC: 9.9 G/DL (ref 11.5–16)
IMM GRANULOCYTES # BLD AUTO: 0 K/UL (ref 0–0.04)
IMM GRANULOCYTES NFR BLD AUTO: 0 % (ref 0–0.5)
INR PPP: 1.4 (ref 0.9–1.1)
LYMPHOCYTES # BLD: 0.7 K/UL (ref 0.8–3.5)
LYMPHOCYTES NFR BLD: 12 % (ref 12–49)
MCH RBC QN AUTO: 29.2 PG (ref 26–34)
MCHC RBC AUTO-ENTMCNC: 32.8 G/DL (ref 30–36.5)
MCV RBC AUTO: 89.1 FL (ref 80–99)
MONOCYTES # BLD: 0.4 K/UL (ref 0–1)
MONOCYTES NFR BLD: 7 % (ref 5–13)
NEUTS SEG # BLD: 4.3 K/UL (ref 1.8–8)
NEUTS SEG NFR BLD: 79 % (ref 32–75)
NRBC # BLD: 0 K/UL (ref 0–0.01)
NRBC BLD-RTO: 0 PER 100 WBC
PLATELET # BLD AUTO: 468 K/UL (ref 150–400)
PMV BLD AUTO: 8.9 FL (ref 8.9–12.9)
POTASSIUM SERPL-SCNC: 3.9 MMOL/L (ref 3.5–5.1)
PROTHROMBIN TIME: 14.5 SEC (ref 9–11.1)
RBC # BLD AUTO: 3.39 M/UL (ref 3.8–5.2)
RBC MORPH BLD: ABNORMAL
SAMPLES BEING HELD,HOLD: NORMAL
SODIUM SERPL-SCNC: 133 MMOL/L (ref 136–145)
WBC # BLD AUTO: 5.6 K/UL (ref 3.6–11)

## 2023-01-31 PROCEDURE — 36415 COLL VENOUS BLD VENIPUNCTURE: CPT

## 2023-01-31 PROCEDURE — 85025 COMPLETE CBC W/AUTO DIFF WBC: CPT

## 2023-01-31 PROCEDURE — 93971 EXTREMITY STUDY: CPT

## 2023-01-31 PROCEDURE — 85610 PROTHROMBIN TIME: CPT

## 2023-01-31 PROCEDURE — 80048 BASIC METABOLIC PNL TOTAL CA: CPT

## 2023-01-31 PROCEDURE — 96374 THER/PROPH/DIAG INJ IV PUSH: CPT

## 2023-01-31 PROCEDURE — 99284 EMERGENCY DEPT VISIT MOD MDM: CPT

## 2023-01-31 PROCEDURE — 73562 X-RAY EXAM OF KNEE 3: CPT

## 2023-01-31 PROCEDURE — 74011250636 HC RX REV CODE- 250/636: Performed by: EMERGENCY MEDICINE

## 2023-01-31 RX ORDER — LIDOCAINE 50 MG/G
1 PATCH TOPICAL EVERY 24 HOURS
Qty: 5 PATCH | Refills: 0 | Status: SHIPPED | OUTPATIENT
Start: 2023-01-31

## 2023-01-31 RX ORDER — METHOCARBAMOL 500 MG/1
500 TABLET, FILM COATED ORAL 4 TIMES DAILY
Qty: 30 TABLET | Refills: 0 | Status: SHIPPED | OUTPATIENT
Start: 2023-01-31

## 2023-01-31 RX ORDER — HYDROMORPHONE HYDROCHLORIDE 1 MG/ML
0.5 INJECTION, SOLUTION INTRAMUSCULAR; INTRAVENOUS; SUBCUTANEOUS ONCE
Status: COMPLETED | OUTPATIENT
Start: 2023-01-31 | End: 2023-01-31

## 2023-01-31 RX ORDER — ACETAMINOPHEN 500 MG
1000 TABLET ORAL 3 TIMES DAILY
Qty: 24 TABLET | Refills: 0 | Status: SHIPPED | OUTPATIENT
Start: 2023-01-31 | End: 2023-02-04

## 2023-01-31 RX ADMIN — SODIUM CHLORIDE 1000 ML: 9 INJECTION, SOLUTION INTRAVENOUS at 07:30

## 2023-01-31 RX ADMIN — HYDROMORPHONE HYDROCHLORIDE 0.5 MG: 1 INJECTION, SOLUTION INTRAMUSCULAR; INTRAVENOUS; SUBCUTANEOUS at 07:32

## 2023-01-31 NOTE — ED PROVIDER NOTES
60-year-old female presenting ER with complaints of pain in her left knee status post total knee replacement on January 13. Patient reports continued swelling and throbbing. Patient reports that she tried to cut back on her oxycodone was taking more of the Tylenol however symptoms seem to worsen. Patient is on Coumadin for DVT prophylaxis after the surgery. No history of blood clots previously reported by the patient. No chest pain or palpitations however when the pain escalates patient reports feeling flushed and tingly all over. Past Medical History:   Diagnosis Date    Arthritis     OSTEO    Chronic pain     DJD LUMBAR; SCIATICA, PAIN IN BUTTOCK ON LEFT OR RIGHT AT DIFFERENT TIMES    IBS (irritable bowel syndrome)     Mitral valve prolapse     Nausea & vomiting     RECENT PRE-OP ANTI-EMETIC EFFECTIVE       Past Surgical History:   Procedure Laterality Date    HX DILATION AND CURETTAGE      HX HEENT  13YO    STRABISMUS REPAIR    HX HYSTERECTOMY  2000    HX ORTHOPAEDIC  12/5/11    KNEE ARTHROSCOPY LEFT    HX ORTHOPAEDIC      STAPLE IN L SHOULDER    IR INJ FORAMIN EPID LUMB ANES/STER SNGL  2/13/2020    IR INJ SPINE THER SUBST LUM/SAC W IMG  9/17/2019         Family History:   Problem Relation Age of Onset    Heart Surgery Mother     Arthritis-rheumatoid Mother     Cancer Father         LUNG - SMOKER    Stroke Brother 36        CEREBRAL ANEURYSM    Other Brother         PSORIATIC ARTHRITIS    Arthritis-rheumatoid Maternal Aunt        Social History     Socioeconomic History    Marital status: SINGLE     Spouse name: Not on file    Number of children: Not on file    Years of education: Not on file    Highest education level: Not on file   Occupational History    Not on file   Tobacco Use    Smoking status: Never    Smokeless tobacco: Never   Substance and Sexual Activity    Alcohol use:  Yes     Alcohol/week: 0.8 standard drinks     Types: 1 Glasses of wine per week    Drug use: Not on file    Sexual activity: Not on file   Other Topics Concern    Not on file   Social History Narrative    Not on file     Social Determinants of Health     Financial Resource Strain: Not on file   Food Insecurity: Not on file   Transportation Needs: Not on file   Physical Activity: Not on file   Stress: Not on file   Social Connections: Not on file   Intimate Partner Violence: Not on file   Housing Stability: Not on file         ALLERGIES: Iodine, Shellfish derived, Adhesive, and Morphine    Review of Systems   Constitutional:  Negative for fever. Musculoskeletal:  Positive for joint swelling. Vitals:    01/31/23 0608   BP: (!) 155/73   Pulse: 95   Resp: 19   Temp: 97.8 °F (36.6 °C)   SpO2: 100%   Weight: 91.1 kg (200 lb 14.4 oz)   Height: 6' 3\" (1.905 m)            Physical Exam  Vitals and nursing note reviewed. Constitutional:       Appearance: She is well-developed. HENT:      Head: Normocephalic. Eyes:      Conjunctiva/sclera: Conjunctivae normal.   Cardiovascular:      Rate and Rhythm: Normal rate and regular rhythm. Pulmonary:      Effort: Pulmonary effort is normal. No respiratory distress. Breath sounds: Normal breath sounds. Abdominal:      General: Bowel sounds are normal.      Palpations: Abdomen is soft. Tenderness: There is no abdominal tenderness. Musculoskeletal:         General: Normal range of motion. Cervical back: Normal range of motion and neck supple. Left knee: Swelling and effusion present. Tenderness present. Comments: Left knee incision healing well with no erythema or induration. No generalized erythema or warmth of the knee. No crepitus. Skin:     General: Skin is warm. Capillary Refill: Capillary refill takes less than 2 seconds. Findings: No rash. Neurological:      Mental Status: She is alert and oriented to person, place, and time.       Comments: No gross motor or sensory deficits        Medical Decision Making  72-year-old presenting ER with complaints of knee pain status post total knee replacement on January 13. Denies fevers or chills. Denies any redness or discharge from her incision. Patient is on anticoagulation for DVT prophylaxis, Coumadin. On exam patient knee is swollen with effusion present which has been previously documented from orthopedic notes. Patient was concern for possible DVT. This is unlikely however will check ultrasound/duplex. Check patient's inr level as well as CBC and BMP. The report of tingling and feeling flushed when she has a wave of pain seems most consistent with moderate vasovagal secondary to pain. Sounds like patient may have de-escalated her pain medications too quickly. X-ray showing no acute abnormalities other than persistent knee effusion. On exam no indication of postop infection. An ultrasound was negative for DVT. Patient's INR was subtherapeutic. Discussed increased dose of her Coumadin and recheck the INR outpatient. Discussed pain treatment strategies and to follow-up with her orthopedic doctor. Amount and/or Complexity of Data Reviewed  External Data Reviewed: notes. Details: [facility]  Labs: ordered. Decision-making details documented in ED Course. Radiology: ordered and independent interpretation performed. Decision-making details documented in ED Course. Risk  OTC drugs. Prescription drug management.            Procedures

## 2023-01-31 NOTE — ED NOTES
Bedside shift change report given to [employer] (oncoming nurse) by Vazquez Bean (offgoing nurse). Report included the following information SBAR, Kardex, ED Summary, Intake/Output, MAR, and Recent Results.

## 2023-01-31 NOTE — ED TRIAGE NOTES
[facility] with c/o left knee pain post left knee surgery two weeks ago that has been uncontrolled with home pain meds. Pt aaox4, gcs 15, rr even and unlabored. Speaking in full clear sentences.

## 2023-02-03 ENCOUNTER — TELEPHONE (OUTPATIENT)
Dept: ORTHOPEDIC SURGERY | Age: 63
End: 2023-02-03

## 2023-02-16 ENCOUNTER — OFFICE VISIT (OUTPATIENT)
Dept: ORTHOPEDIC SURGERY | Age: 63
End: 2023-02-16
Payer: COMMERCIAL

## 2023-02-16 DIAGNOSIS — M25.562 LEFT KNEE PAIN, UNSPECIFIED CHRONICITY: Primary | ICD-10-CM

## 2023-02-16 DIAGNOSIS — M17.12 PRIMARY OSTEOARTHRITIS OF LEFT KNEE: ICD-10-CM

## 2023-02-16 NOTE — PROGRESS NOTES
Amena Mckeon (: 1960) is a 63 y.o. Knee Pain       Patient Name: Amena Mckeon  Date:2023  : 1960  [x]  Patient  Verified  Payor: Shira Trinh / Plan: Reagan Abarca / Product Type: HMO /    Sheila Whitaker MD  Total Treatment Time (min): 60  Total Timed Codes (min): 45  1:1 Treatment Time ( [address] only): N/A  Visit #:  30      Treatment Area: Left knee    ASSESSMENT/PLAN:  Below is the assessment and plan developed based on review of pertinent history, physical exam, labs, studies, and medications. Patient comes in today status post left total knee replacement on 2023 and presents to physical therapy deficits including gait, range of motion, swelling, flexibility, and strength. She will benefit from a physical therapy program to address above-mentioned deficits. Short-term goals: To become independent with today's prescribed home exercise program in 1 week. Long-term goals: To progress with a physical therapy program so the patient demonstrates functional left knee strength and range of motion allowing her to negotiate going up and down stairs using alternate step pattern without knee pain or instability as well as increase her walking program 2 distances of up to 3 miles without knee pain or limitation in the next 8 to 12 weeks. Patient will be seen twice a week for up to 20 visits  with focus on progressive restoration of range of motion and strength, balance, and functional mobility. Therapeutic applications will include but are not limited to:Manual therapy, joint mobilization, myofascial release, therapeutic exercises. Modalities including ultrasound and electric stimulation heat and ice. Kinesiotape and Peguero taping for joint reeducation and approximation of tissue for neuromuscular reeducation. 1. Left knee pain, unspecified chronicity  2.  Primary osteoarthritis of left knee    Return in about 4 days (around 2/20/2023). SUBJECTIVE:  HPI  Patient reports improvement since surgery. She did have a bad reaction to oxy Contin which caused some increased pain and elevated blood pressure. She is now taking gabapentin at night and Tylenol during the day. She is sleeping better. Patient completed 3 weeks of home PT. She has been walking throughout the day. 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 Still has some soreness in her quad and pain along the lateral part of her knee. Long-term goals are to return to activities such as golf, water sports, and regular walking program.  Significant past medical history includes history of low back pain with sciatica as well as right Achilles repair in 2021. Please see patient's medical chart for detail list of current medications as well as significant past medical history. OBJECTIVE:  Evaluation (20 minutes)  Patient comes in today demonstrating slight antalgic gait. Relative weakness with modified step down and step up testing on the left side. Left knee: PROM measures -2 degrees extension and 110 degrees of flexion. 3.0 cm increased circumferential measurement at mid patella. Surgical incision is healing nicely with no signs of infection. Mild subdermal adhesion around surgical scar line. Hypomobility to patellofemoral joint. Patient has a fair quadriceps contraction. She is able to perform repetitive supine straight leg raise. Calf is nontender. Knee is stable with four-way ligamentous testing. Flexibility restriction to gastroc, hamstring, IT band, and quadriceps. Relative difficulty with single-leg balance. Modalities(mins 15)  Ice Posttreatment    Exercise(mins 25)  With today's interventions we initiated exercises for range of motion, strengthening, flexibility, and gait training for patient perform at home on a daily basis.   Today's exercises include recumbent bike, TKE, straight leg raise, knee flexion stretch, standing marching, and IT band stretch. An electronic signature was used to authenticate this note.   -- Reese Landis, PT

## 2023-02-22 ENCOUNTER — OFFICE VISIT (OUTPATIENT)
Dept: ORTHOPEDIC SURGERY | Age: 63
End: 2023-02-22
Payer: COMMERCIAL

## 2023-02-22 ENCOUNTER — OFFICE VISIT (OUTPATIENT)
Dept: ORTHOPEDIC SURGERY | Age: 63
End: 2023-02-22

## 2023-02-22 DIAGNOSIS — Z96.652 HISTORY OF TOTAL KNEE ARTHROPLASTY, LEFT: Primary | ICD-10-CM

## 2023-02-22 PROCEDURE — 97140 MANUAL THERAPY 1/> REGIONS: CPT

## 2023-02-22 PROCEDURE — 99024 POSTOP FOLLOW-UP VISIT: CPT | Performed by: ORTHOPAEDIC SURGERY

## 2023-02-22 PROCEDURE — 97110 THERAPEUTIC EXERCISES: CPT

## 2023-02-22 NOTE — PROGRESS NOTES
Rashel Garner (: 1960) is a 63 y.o. Knee Pain       Patient Name: Rashel Garner  EAFF:  : 1960  [x]  Patient  Verified  Payor: Jenna Money / Plan: [email or web address] / Product Type: HMO /    Freddie Pierce MD  Total Treatment Time (min): 65  Total Timed Codes (min): 55  1:1 Treatment Time ( [address] only): N/A  Visit #: 2 of 30      Treatment Area: Left knee    ASSESSMENT/PLAN:  Below is the assessment and plan developed based on review of pertinent history, physical exam, labs, studies, and medications. Patient appears compliant with home exercise program. Progressing well with strength and endurance ambulating 20+ minuets without pain. Most limited by knee flexion. I have demonstrated and had patient demo back heel slides to work on deficit at home. Continue to progress range of motion,flexibility, and strength as tolerated. 1. History of total knee arthroplasty, left    Return in about 6 days (around 2023) for Continue therapy for knee. SUBJECTIVE:  HPI    Reports that she is sleeping well, reciprocal gait on stairs, walking up to 20+ minutes on all surface types with no increase in pain. Stated that she is performing her exercises at home, including doing heel props 2x a day. OBJECTIVE:  ROM:  NT    Manual: ( 15 Min)   Osteo-and Arthrokinematic mobilization for restoration of normal joint mechanics to allow for improved hip flexion and extension. Tib-Fib mobilization to increase flexion. Short and long axis hip distraction, Anterior/ posterior patellar glides, Patellar mobilizations. XFM to scar tissue. Modalities(mins 10)  Ice Posttreatment with heel prop    Exercise(mins 40)  With today's interventions we initiated exercises for range of motion, strengthening, flexibility, and gait training for patient perform at home on a daily basis.   Today's exercises include recumbent bike, TKE, straight leg raise, knee flexion stretch, standing marching, wall slides, Step up/ down, sliders, and IT band stretch.    --Co-treated by ALYSSIA Hankins

## 2023-02-22 NOTE — LETTER
2/22/2023    Patient: Valerie Watson   YOB: 1960   Date of Visit: 2/22/2023     Kayleen Miller MD  [place] [address]  Via Fax: [phone]    Dear Kayleen Miller MD,      Thank you for referring Ms. Valerie Watson to [facility] for evaluation. My notes for this consultation are attached. If you have questions, please do not hesitate to call me. I look forward to following your patient along with you.       Sincerely,    Art Orozco MD

## 2023-02-22 NOTE — PROGRESS NOTES
Teodora Cleary (: 1960) is a 63 y.o. female, patient, here for evaluation of the following chief complaint(s):  Knee Pain (Left knee pain )       HPI:    Patient presents the office today now s/p total knee replacement. Patient states she is doing much better. She still does note some pain. She is sleeping better and her pain is now tolerable. She has made significant advancements with physical therapy. Allergies   Allergen Reactions    Iodine Anaphylaxis    Shellfish Derived Anaphylaxis    Adhesive Rash and Itching     BANDAIDS    Morphine Rash, Itching and Hives     spoke with pt who explained morphine produced red splotches all over not hives; ok w/ it again if needed as well as other opioids; no complications w/ Lortab, Percocet, etc.  Other reaction(s): hives       Current Outpatient Medications   Medication Sig    lidocaine (Lidoderm) 5 % 1 Patch by TransDERmal route every twenty-four (24) hours. Apply patch to the affected area for 12 hours a day and remove for 12 hours a day. Indications: pain    methocarbamoL (ROBAXIN) 500 mg tablet Take 1 Tablet by mouth four (4) times daily. warfarin (COUMADIN) 2 mg tablet Take 1 Tablet by mouth daily. aspirin (ASPIRIN) 325 mg tablet Take 325 mg by mouth daily. diclofenac (VOLTAREN) 1 % gel apply 4 grams to affected area as needed    DULoxetine (CYMBALTA) 30 mg capsule Take 30 mg by mouth daily. losartan (COZAAR) 50 mg tablet TAKE 1 TABLET BY MOUTH EVERY DAY FOR 30 DAYS    estradioL (ESTRACE) 1 mg tablet     cyclobenzaprine (FLEXERIL) 10 mg tablet Take  by mouth three (3) times daily as needed for Muscle Spasm(s). cetirizine (ZYRTEC) 10 mg tablet Take  by mouth daily. oxyCODONE-acetaminophen (PERCOCET) 5-325 mg per tablet Take 1-2 Tabs by mouth every four (4) hours as needed for Pain. No current facility-administered medications for this visit.        Past Medical History:   Diagnosis Date    Arthritis     OSTEO    Chronic pain DJD LUMBAR; SCIATICA, PAIN IN BUTTOCK ON LEFT OR RIGHT AT DIFFERENT TIMES    IBS (irritable bowel syndrome)     Mitral valve prolapse     Nausea & vomiting     RECENT PRE-OP ANTI-EMETIC EFFECTIVE        Past Surgical History:   Procedure Laterality Date    HX DILATION AND CURETTAGE      HX HEENT  13YO    STRABISMUS REPAIR    HX HYSTERECTOMY  2000    HX ORTHOPAEDIC  12/5/11    KNEE ARTHROSCOPY LEFT    HX ORTHOPAEDIC      STAPLE IN L SHOULDER    IR INJ FORAMIN EPID LUMB ANES/STER SNGL  2/13/2020    IR INJ SPINE THER SUBST LUM/SAC W IMG  9/17/2019       Family History   Problem Relation Age of Onset    Heart Surgery Mother     Arthritis-rheumatoid Mother     Cancer Father         LUNG - SMOKER    Stroke Brother 36        CEREBRAL ANEURYSM    Other Brother         PSORIATIC ARTHRITIS    Arthritis-rheumatoid Maternal Aunt         Social History     Socioeconomic History    Marital status: SINGLE     Spouse name: Not on file    Number of children: Not on file    Years of education: Not on file    Highest education level: Not on file   Occupational History    Not on file   Tobacco Use    Smoking status: Never    Smokeless tobacco: Never   Substance and Sexual Activity    Alcohol use: Yes     Alcohol/week: 0.8 standard drinks     Types: 1 Glasses of wine per week    Drug use: Not on file    Sexual activity: Not on file   Other Topics Concern    Not on file   Social History Narrative    Not on file     Social Determinants of Health     Financial Resource Strain: Not on file   Food Insecurity: Not on file   Transportation Needs: Not on file   Physical Activity: Not on file   Stress: Not on file   Social Connections: Not on file   Intimate Partner Violence: Not on file   Housing Stability: Not on file       Review of Systems   Musculoskeletal:         Left knee pain       Vitals: There were no vitals taken for this visit. There is no height or weight on file to calculate BMI.     Ortho Exam     Left knee: Incision has healed. Range of motion 0-100 degrees. 2+ knee effusion decreased compared to prior exam.  Calf soft and supple. Neurovascular examination is intact. ASSESSMENT/PLAN:    Patient is making slow steady progress her range of motion is coming along she is terminal extension. She is to continue to advance with physical therapy to improve her flexion. The effusion is also starting to resolve I suspect it will continue. Patient is to return to the office in 4 weeks.         Wing Stefanie MD

## 2023-02-24 ENCOUNTER — OFFICE VISIT (OUTPATIENT)
Dept: ORTHOPEDIC SURGERY | Age: 63
End: 2023-02-24
Payer: COMMERCIAL

## 2023-02-24 DIAGNOSIS — M17.12 PRIMARY OSTEOARTHRITIS OF LEFT KNEE: ICD-10-CM

## 2023-02-24 DIAGNOSIS — M25.562 LEFT KNEE PAIN, UNSPECIFIED CHRONICITY: Primary | ICD-10-CM

## 2023-02-24 NOTE — PROGRESS NOTES
Sapphire Brownlee (: 1960) is a 63 y.o. Knee Pain       Patient Name: Sapphire Brownlee  ONHX:[id number]  : 1960  [x]  Patient  Verified  Payor: Dominic Robison / Plan: Jf Gresham / Product Type: HMO /    Ken Bergeron MD  Total Treatment Time (min): 70  Total Timed Codes (min): 60  1:1 Treatment Time ( [address] only): N/A  Visit #: 3  30      Treatment Area: Left knee    ASSESSMENT/PLAN:  Below is the assessment and plan developed based on review of pertinent history, physical exam, labs, studies, and medications. Doing well now 6 weeks status post TKR. Will benefit from continued work with and range of motion and functional strengthening. 1. Left knee pain, unspecified chronicity  2. Primary osteoarthritis of left knee    Return in about 1 week (around 3/3/2023). SUBJECTIVE:  HPI    Knee still feels stiff at times but doing well. OBJECTIVE:  ROM: Minus 2/120    Manual: ( 15 Min)   Osteo-and Arthrokinematic mobilization for restoration of normal joint mechanics to allow for improved hip flexion and extension. Tib-Fib mobilization to increase flexion. Short and long axis hip distraction, Anterior/ posterior patellar glides, Patellar mobilizations. XFM to scar tissue. Myofascial release to distal IT band. Modalities(mins 10)  Ice Posttreatment with heel prop    Exercise(mins 45)  Today's exercises include recumbent bike, TKE, straight leg raise, supine IT band stretch, prone knee flexion stretch, knee flexion stretch, standing marching, wall slides, Step up/ down, sliders, and IT band stretch.

## 2023-02-27 ENCOUNTER — OFFICE VISIT (OUTPATIENT)
Dept: ORTHOPEDIC SURGERY | Age: 63
End: 2023-02-27
Payer: COMMERCIAL

## 2023-02-27 DIAGNOSIS — M17.12 PRIMARY OSTEOARTHRITIS OF LEFT KNEE: Primary | ICD-10-CM

## 2023-02-27 DIAGNOSIS — M25.562 LEFT KNEE PAIN, UNSPECIFIED CHRONICITY: ICD-10-CM

## 2023-03-01 ENCOUNTER — OFFICE VISIT (OUTPATIENT)
Dept: ORTHOPEDIC SURGERY | Age: 63
End: 2023-03-01

## 2023-03-01 DIAGNOSIS — M25.562 LEFT KNEE PAIN, UNSPECIFIED CHRONICITY: ICD-10-CM

## 2023-03-01 DIAGNOSIS — M17.12 PRIMARY OSTEOARTHRITIS OF LEFT KNEE: Primary | ICD-10-CM

## 2023-03-01 NOTE — PROGRESS NOTES
Stuart Schafer (: 1960) is a 63 y.o. Knee Pain       Patient Name: Stuart Schafer  Date:3/1/2023  : 1960  [x]  Patient  Verified  Payor: Saul Leslie / Plan: Birtha Kocher / Product Type: HMO /    Dominique Blank MD  Total Treatment Time (min): 65  Total Timed Codes (min): 55  1:1 Treatment Time ([facility] [place] [place] only): N/A  Visit #:  30      Treatment Area: Left knee    ASSESSMENT/PLAN:  Below is the assessment and plan developed based on review of pertinent history, physical exam, labs, studies, and medications. Patient's cath is no longer an issue. Her extension is approaching contralateral side. Still needs to work on combined knee and hip extension through stance phase of gait cycle. Continue with current plan of care and progress towards long-term goals. 1. Primary osteoarthritis of left knee  2. Left knee pain, unspecified chronicity    Return in about 3 days (around 3/4/2023). SUBJECTIVE:  HPI    Doing better. Her calf is much less sore. OBJECTIVE:  ROM: Minus 2/120    Manual: ( 10 Min)   Osteo-and Arthrokinematic mobilization for restoration of normal joint mechanics to allow for improved hip flexion and extension. Tib-Fib mobilization to increase flexion. Patellar mobilizations. XFM to scar tissue. Myofascial release to distal IT band. Kinesiotape to medial gastroc per protocol. Modalities(mins 10)  Ice Posttreatment with heel prop    Exercise(mins 45)  Today's exercises include recumbent bike, TKE, straight leg raise, supine IT band stretch, prone knee flexion stretch, knee flexion stretch, standing marching, wall slides, Step up/ down, treadmill, reverse TKE, sliders, and IT band stretch.

## 2023-03-06 ENCOUNTER — OFFICE VISIT (OUTPATIENT)
Dept: ORTHOPEDIC SURGERY | Age: 63
End: 2023-03-06

## 2023-03-06 DIAGNOSIS — M17.12 PRIMARY OSTEOARTHRITIS OF LEFT KNEE: Primary | ICD-10-CM

## 2023-03-06 DIAGNOSIS — M25.562 LEFT KNEE PAIN, UNSPECIFIED CHRONICITY: ICD-10-CM

## 2023-03-06 NOTE — PROGRESS NOTES
Yao Stratton (: 1960) is a 63 y.o. Knee Pain       Patient Name: Yao Stratton  Date:3/6/2023  : 1960  [x]  Patient  Verified  Payor: Ruby Blackman / Plan: Darion Current / Product Type: HMO /    Jenaro Ayala MD  Total Treatment Time (min): 70  Total Timed Codes (min): 55  1:1 Treatment Time ( [address] only): N/A  Visit #: 6  30      Treatment Area: Left knee    ASSESSMENT/PLAN:  Below is the assessment and plan developed based on review of pertinent history, physical exam, labs, studies, and medications. Better with combined hip and knee extension with gait pattern. Complaints of medial knee pain not unusual at this point after surgery. Continue with current plan of care and progress towards long-term goals. 1. Primary osteoarthritis of left knee  2. Left knee pain, unspecified chronicity    Return in about 3 days (around 3/9/2023). SUBJECTIVE:  HPI    Complaining of some medial side knee pain that kept her up last night. OBJECTIVE:  ROM: Minus 2/120    Manual: ( 5 Min)   Tib-Fib mobilization to increase flexion. Patellar mobilizations. XFM to scar tissue. Kinesiotape to medial gastroc per protocol. Ultrasound (10 minutes)  Medial joint line at 50% duty cycle and 2.0 MHz    Modalities(mins 10)  Ice Posttreatment with heel prop    Exercise(mins 45)  Today's exercises include recumbent bike, TKE, straight leg raise, resisted abduction walk, supine IT band stretch, double cup walk, knee flexion stretch, standing marching, wall slides, Step up/ down, reverse TKE, sliders, and IT band stretch.

## 2023-03-10 ENCOUNTER — OFFICE VISIT (OUTPATIENT)
Dept: ORTHOPEDIC SURGERY | Age: 63
End: 2023-03-10

## 2023-03-10 DIAGNOSIS — M17.12 PRIMARY OSTEOARTHRITIS OF LEFT KNEE: Primary | ICD-10-CM

## 2023-03-10 DIAGNOSIS — Z96.652 HISTORY OF TOTAL KNEE ARTHROPLASTY, LEFT: ICD-10-CM

## 2023-03-10 NOTE — PROGRESS NOTES
Dennis Aguilar (: 1960) is a 63 y.o. Knee Pain       Patient Name: Dennis Aguilar  Date:3/10/2023  : 1960  [x]  Patient  Verified  Payor: Randall Sultana / Plan: Wayne Rosa / Product Type: HMO /    Nadia Dorsey MD  Total Treatment Time (min): 70  Total Timed Codes (min): 55  1:1 Treatment Time ( [address] only): N/A  Visit #: 6 of 30      Treatment Area: Left knee    ASSESSMENT/PLAN:  Below is the assessment and plan developed based on review of pertinent history, physical exam, labs, studies, and medications. Patient has achieved TKE. Continues to have 5+/10 medial knee pain with ambulation that limits her function the next day. I have demonstrated and encouraged her to stretch more often in order to decrease tension along joint line, she was able to demonstrate back. We will continue to work on flexibilty and strength to improve tolerance to ambulation with less pain. Continue with current plan of care and progress towards long-term goals. 1. Primary osteoarthritis of left knee  2. History of total knee arthroplasty, left    Return in about 4 days (around 3/14/2023) for Continue therapy for knee . SUBJECTIVE:  HPI    Stated that she is doing good overall just having medial knee pain with walking, starts at 5/10 pain and increases as she walks. K tap along medial knee caused more pain. OBJECTIVE:  ROM: Minus 0/120    Manual: ( 5 Min)   Tib-Fib mobilization to increase flexion. Patellar mobilizations. XFM to scar tissue.         Ultrasound (10 minutes)  Medial joint line at 50% duty cycle and 2.0 MHz    Modalities(mins 10)  Ice Posttreatment     Exercise(mins 45)  Today's exercises include recumbent bike, TKE, straight leg raise, resisted abduction walk, supine IT band stretch, double cup walk, knee flexion stretch, hip flexor stretch, standing marching, wall slides, Step up/ down, reverse TKE, sliders, and adductor stretch.    --Co-treated by ALYSSIA Briones

## 2023-03-15 ENCOUNTER — OFFICE VISIT (OUTPATIENT)
Dept: ORTHOPEDIC SURGERY | Age: 63
End: 2023-03-15

## 2023-03-15 DIAGNOSIS — M17.12 PRIMARY OSTEOARTHRITIS OF LEFT KNEE: Primary | ICD-10-CM

## 2023-03-15 DIAGNOSIS — Z96.652 HISTORY OF TOTAL KNEE ARTHROPLASTY, LEFT: ICD-10-CM

## 2023-03-15 NOTE — PROGRESS NOTES
César Knee (: 1960) is a 63 y.o. No chief complaint on file. Patient Name: César Quintana  Date:3/15/2023  : 1960  [x]  Patient  Verified  Payor: Ja Ronni / Plan: Harmony Nagy / Product Type: HMO /    Scottie Salinas MD  Total Treatment Time (min): 70  Total Timed Codes (min): 55  1:1 Treatment Time ( [address] only): N/A  Visit #: 8 of 30      Treatment Area: Left knee    ASSESSMENT/PLAN:  Below is the assessment and plan developed based on review of pertinent history, physical exam, labs, studies, and medications. Patient is doing well overall completing lunging, squats, and SL dynamic stability with no increase in pain. Making steady progress from 120 degrees to 125 degrees of flexion this session. Continues to have medial knee pain under patella and mild edema. Continue with current plan of care and progress towards long-term goals. 1. Primary osteoarthritis of left knee  2. History of total knee arthroplasty, left    Return in about 2 days (around 3/17/2023) for Continue therapy for knee pain. SUBJECTIVE:  HPI    Stated that has been stretching and walking as tolerated and this is going well. OBJECTIVE:  ROM:  0/125    Manual: ( 5 Min)   Tib-Fib mobilization to increase flexion. Patellar mobilizations and glides into flexion. Eflerage and petrassage to lateral joint line to decrease edema.       Ultrasound (10 minutes)  Medial joint line at 50% duty cycle and 2.0 MHz    Modalities(mins 10)  Ice Posttreatment       Exercise(mins 45)  Today's exercises include recumbent bike, TKE bungie , reverse lunges, resisted abduction walk, supine IT band stretch, squats with ball squeeze, knee flexion stretch, hip flexor stretch, standing marching, wall slides, Step up/ down, reverse TKE, sliders, and adductor stretch.    --Co-treated by ALYSSIA Springer

## 2023-03-17 ENCOUNTER — OFFICE VISIT (OUTPATIENT)
Dept: ORTHOPEDIC SURGERY | Age: 63
End: 2023-03-17

## 2023-03-17 DIAGNOSIS — M25.562 LEFT KNEE PAIN, UNSPECIFIED CHRONICITY: ICD-10-CM

## 2023-03-17 DIAGNOSIS — M17.12 PRIMARY OSTEOARTHRITIS OF LEFT KNEE: Primary | ICD-10-CM

## 2023-03-18 NOTE — PROGRESS NOTES
Kristine Patel (: 1960) is a 63 y.o. Knee Pain       Patient Name: Kristine Patel  Date:3/18/2023  : 1960  [x]  Patient  Verified  Payor: Madisyn Poles / Plan: Siobhan Guillen / Product Type: HMO /    Connie Patton MD  Total Treatment Time (min): 70  Total Timed Codes (min): 55  1:1 Treatment Time ( [address] only): N/A  Visit #:       Treatment Area: Left knee    ASSESSMENT/PLAN:  Below is the assessment and plan developed based on review of pertinent history, physical exam, labs, studies, and medications. Patient is doing very well overall with her range of motion and strengthening program.  She does have recent onset of some medial tibia pain with walking. She will talk to Dr. Yokasta Watt about this next week during their 6-week follow-up. Continue with current plan of care and progress towards long-term goals. 1. Primary osteoarthritis of left knee  2. Left knee pain, unspecified chronicity    Return in about 4 days (around 3/21/2023). SUBJECTIVE:  HPI  Having some medial sided shin pain with walking that increases the more she is on her feet. OBJECTIVE:  ROM:  0/125    Manual: ( 10Min)   Tib-Fib mobilization to increase flexion. Patellar mobilizations and glides into flexion. Eflerage and petrassage to lateral joint line to decrease edema. Manual hamstring and IT band stretch. Ultrasound     Modalities(mins 10)  Ice Posttreatment       Exercise(mins 45)  Today's exercises include recumbent bike, TKE bungie , reverse lunges, resisted abduction walk, supine IT band stretch, squats with ball squeeze, knee flexion stretch, hip flexor stretch, bungee walks, standing marching, wall slides, Step up/ down, reverse TKE, sliders, and adductor stretch.

## 2023-03-22 ENCOUNTER — OFFICE VISIT (OUTPATIENT)
Dept: ORTHOPEDIC SURGERY | Age: 63
End: 2023-03-22

## 2023-03-22 ENCOUNTER — OFFICE VISIT (OUTPATIENT)
Dept: ORTHOPEDIC SURGERY | Age: 63
End: 2023-03-22
Payer: COMMERCIAL

## 2023-03-22 DIAGNOSIS — M25.562 LEFT KNEE PAIN, UNSPECIFIED CHRONICITY: ICD-10-CM

## 2023-03-22 DIAGNOSIS — M17.12 PRIMARY OSTEOARTHRITIS OF LEFT KNEE: Primary | ICD-10-CM

## 2023-03-22 DIAGNOSIS — Z96.652 HISTORY OF TOTAL KNEE ARTHROPLASTY, LEFT: Primary | ICD-10-CM

## 2023-03-22 PROCEDURE — 99024 POSTOP FOLLOW-UP VISIT: CPT | Performed by: ORTHOPAEDIC SURGERY

## 2023-03-22 NOTE — PROGRESS NOTES
Bakari Silva (: 1960) is a 63 y.o. Knee Pain       Patient Name: Bakari Silva  Date:3/22/2023  : 1960  [x]  Patient  Verified  Payor: Hieumello Maddoxcraig / Plan: Hillary Brooms / Product Type: HMO /    Unknown MD Parth  Total Treatment Time (min): 70  Total Timed Codes (min): 55  1:1 Treatment Time ( [address] only): N/A  Visit #:       Treatment Area: Left knee    ASSESSMENT/PLAN:  Below is the assessment and plan developed based on review of pertinent history, physical exam, labs, studies, and medications. Patient is doing well with range of motion. We will decrease frequency to once a week and progress with functional strengthening, flexibility, and proprioception. 1. Primary osteoarthritis of left knee  2. Left knee pain, unspecified chronicity    Return in about 3 days (around 3/25/2023). SUBJECTIVE:  HPI  Had MD follow-up today. He is not concerned about the pain she is having with weightbearing on the medial side of her knee and tibia. Continue with PT to work on strengthening. OBJECTIVE:  ROM:  0/125    Manual: ( 10Min)   Tib-Fib mobilization to increase flexion. Soft tissue massage/myofascial release to lateral quadriceps and IT band. Sherian Bel[place] and petrassage to lateral joint line to decrease edema. Manual hamstring and IT band stretch. Ultrasound     Modalities(mins 10)  Ice Posttreatment       Exercise(mins 45)  Today's exercises include recumbent bike, TKE bungie , reverse lunges, resisted abduction walk, supine IT band stretch, squats with ball squeeze, knee flexion stretch, hip flexor stretch, bungee walks, standing marching, single-leg balance, Step up/ down, reverse TKE, sliders, and adductor stretch.

## 2023-03-24 ENCOUNTER — OFFICE VISIT (OUTPATIENT)
Dept: ORTHOPEDIC SURGERY | Age: 63
End: 2023-03-24

## 2023-03-24 DIAGNOSIS — M25.562 LEFT KNEE PAIN, UNSPECIFIED CHRONICITY: ICD-10-CM

## 2023-03-24 DIAGNOSIS — M17.12 PRIMARY OSTEOARTHRITIS OF LEFT KNEE: Primary | ICD-10-CM

## 2023-03-24 NOTE — PROGRESS NOTES
Kelly Crabtree (: 1960) is a 63 y.o. Knee Pain       Patient Name: Kelly Crabtree  Date:3/24/2023  : 1960  [x]  Patient  Verified  Payor: Adri Dutton / Plan: Loran Gowers / Product Type: HMO /    Kalin Gonzalez MD  Total Treatment Time (min): 70  Total Timed Codes (min): 55  1:1 Treatment Time ( [address] only): N/A  Visit #: 10 of 30      Treatment Area: Left knee    ASSESSMENT/PLAN:  Below is the assessment and plan developed based on review of pertinent history, physical exam, labs, studies, and medications. Patient continues to progress with functional knee strength and range of motion. We tried some dry needling today to see if this helps with her medial knee knee pain with weightbearing. Tolerated well. We will assess response next visit and repeat if indicated. 1. Primary osteoarthritis of left knee  2. Left knee pain, unspecified chronicity    Return in about 1 week (around 3/31/2023). SUBJECTIVE:  HPI  Medial knee is bothersome today. OBJECTIVE:  ROM:  0/125    Manual:    Dry needling (15 minutes)  Patient was counseled regarding the risk and benefits of dry needling. She denied having any of the listed precautions/contradiction to dry needling and signed the consent form. 4 Needles were placed to medial knee per chronic knee pain protocol. We then connected to microcurrent electrical stimulation at approximately 5 µg for 15 min. Keri Kiser, DN    Modalities(mins 10)  Ice Posttreatment       Exercise(mins 45)  Today's exercises include recumbent bike, TKE bungie , reverse lunges, resisted abduction walk, supine IT band stretch, squats with ball squeeze, knee flexion stretch, hip flexor stretch, bungee walks, standing marching, single-leg balance, Step up/ down, reverse TKE, sliders, and adductor stretch.

## 2023-03-29 ENCOUNTER — OFFICE VISIT (OUTPATIENT)
Dept: ORTHOPEDIC SURGERY | Age: 63
End: 2023-03-29
Payer: COMMERCIAL

## 2023-03-29 DIAGNOSIS — M17.12 PRIMARY OSTEOARTHRITIS OF LEFT KNEE: Primary | ICD-10-CM

## 2023-03-29 DIAGNOSIS — M25.562 LEFT KNEE PAIN, UNSPECIFIED CHRONICITY: ICD-10-CM

## 2023-03-29 PROCEDURE — 20560 NDL INSJ W/O NJX 1 OR 2 MUSC: CPT

## 2023-03-29 PROCEDURE — 97110 THERAPEUTIC EXERCISES: CPT

## 2023-03-29 NOTE — PROGRESS NOTES
Victorina Leal (: 1960) is a 63 y.o. Knee Pain       Patient Name: Victorina Leal  Date:3/29/2023  : 1960  [x]  Patient  Verified  Payor: Denisse Quiet / Plan: Samreen Bar / Product Type: HMO /    Chente Murphy MD  Total Treatment Time (min): 70  Total Timed Codes (min): 55  1:1 Treatment Time ([facility] only): N/A  Visit #:       Treatment Area: Left knee    ASSESSMENT/PLAN:  Below is the assessment and plan developed based on review of pertinent history, physical exam, labs, studies, and medications. Patient making steady strength gains each session. Continues to have medial knee pain with ambulation. We have tried K tape and will do this again if indicated. She will follow up next session. 1. Primary osteoarthritis of left knee  2. Left knee pain, unspecified chronicity    Return in about 1 day (around 3/30/2023) for continue therapy for knee pain . SUBJECTIVE:  HPI    Stated that her knee pain is mostly when she is walking. She continues to push through exercises. Reports dry needling did not help very much. OBJECTIVE:  ROM:  0/125      Dry needling (15 minutes)  Patient was counseled regarding the risk and benefits of dry needling. She denied having any of the listed precautions/contradiction to dry needling and signed the consent form. 4 Needles were placed to medial knee per chronic knee pain protocol. We then connected to microcurrent electrical stimulation at approximately 5 µg for 15 min.   JOSE ANTONIO Estevez    Modalities(mins 10)  Ice Posttreatment       Exercise(mins 45)  Today's exercises include recumbent bike, TKE bungie , reverse lunges, resisted abduction walk, supine IT band stretch, squats with ball squeeze, knee flexion stretch, hip flexor stretch, bungee walks, standing marching, single-leg balance, Step up/ down, reverse TKE, sliders, and adductor stretch.    --Co-treated by ALYSSIA Deluna

## 2023-04-05 ENCOUNTER — OFFICE VISIT (OUTPATIENT)
Dept: ORTHOPEDIC SURGERY | Age: 63
End: 2023-04-05

## 2023-04-05 NOTE — PROGRESS NOTES
Kristine Patel (: 1960) is a 63 y.o. Knee Pain       Patient Name: Kristine Patel  Date:2023  : 1960  [x]  Patient  Verified  Payor: Madisyn Poles / Plan: Siobhan Guillen / Product Type: HMO /    Connie Patton MD  Total Treatment Time (min): 70  Total Timed Codes (min): 55  1:1 Treatment Time ( [address] only): N/A  Visit #: 12  30      Treatment Area: Left knee    ASSESSMENT/PLAN:  Below is the assessment and plan developed based on review of pertinent history, physical exam, labs, studies, and medications. Patient is doing well overall performing lunges and squats with good control. Advised her to start doing heel taps to increase patellar tendon loading. Decreased medial pain with K-tape. Education on K-tape application, patient was able to demonstrate back. She will follow up next session and we will discuss going to 1x week for 4 weeks. 1. Primary osteoarthritis of left knee  2. Left knee pain, unspecified chronicity    Return in about 6 days (around 2023) for Continue therapy for knee. SUBJECTIVE:  HPI    Couldn't tell if K-tape was working but once removed she felt more knee pain. OBJECTIVE:  ROM:  0/125      Dry needling (15 minutes)  Patient was counseled regarding the risk and benefits of dry needling. She denied having any of the listed precautions/contradiction to dry needling and signed the consent form. 4 Needles were placed to medial knee per chronic knee pain protocol. We then connected to microcurrent electrical stimulation at approximately 5 µg for 15 min.   JOSE ANTONIO Bocanegra    Modalities(mins 10)  Ice Posttreatment   K-tape to medial knee joint      Exercise(mins 45)  Today's exercises include recumbent bike, TKE bungie , reverse lunges, resisted abduction walk, supine IT band stretch, squats with ball squeeze, knee flexion stretch, hip flexor stretch, bungee walks, standing marching, single-leg balance, Step up/ down, reverse TKE, sliders, and adductor stretch.    --Co-treated by ALYSSIA Garnett

## 2023-04-25 ENCOUNTER — OFFICE VISIT (OUTPATIENT)
Dept: ORTHOPEDIC SURGERY | Age: 63
End: 2023-04-25
Payer: COMMERCIAL

## 2023-04-25 DIAGNOSIS — M25.562 LEFT KNEE PAIN, UNSPECIFIED CHRONICITY: ICD-10-CM

## 2023-04-25 DIAGNOSIS — M17.12 PRIMARY OSTEOARTHRITIS OF LEFT KNEE: Primary | ICD-10-CM

## 2023-04-25 PROCEDURE — 97140 MANUAL THERAPY 1/> REGIONS: CPT | Performed by: PHYSICAL THERAPIST

## 2023-04-25 PROCEDURE — 97110 THERAPEUTIC EXERCISES: CPT | Performed by: PHYSICAL THERAPIST

## 2023-04-25 NOTE — PROGRESS NOTES
Jean Al (: 1960) is a 63 y.o. Knee Pain       Patient Name: Jean Al  Date:2023  : 1960  [x]  Patient  Verified  Payor: Janna Hair / Plan: Gay Weiss / Product Type: HMO /    Terell Lozano MD  Total Treatment Time (min): 65  Total Timed Codes (min): 55  1:1 Treatment Time ( [address] only): N/A  Visit #:  of 30      Treatment Area: Left knee    ASSESSMENT/PLAN:  Below is the assessment and plan developed based on review of pertinent history, physical exam, labs, studies, and medications. Still has some restriction through her quadricep and IT band. Patient has excellent quad recruitment into terminal knee extension. Knee flexion approaching functional limits. Continue with current plan of care progress towards long-term goals. 1. Primary osteoarthritis of left knee  2. Left knee pain, unspecified chronicity    Return in about 1 week (around 2023). SUBJECTIVE:  HPI    Still getting some occasional pain into her quad. OBJECTIVE:  ROM:  0/125        Modalities(mins 10)  Ice Posttreatment   K-tape to medial knee joint    Manual (10 minutes)  Patellofemoral mobilization with passive range of motion. Soft tissue massage/myofascial release to quadriceps and IT band. Retrograde massage to peripatellar and quadriceps with knee flexed at 100 degrees. Exercise(mins 45)  Today's exercises include recumbent bike, TKE bungie , reverse lunges, resisted abduction walk, supine IT band stretch, squats with ball squeeze, knee flexion stretch, hip flexor stretch, bungee walks, standing marching, single-leg balance, Step up/ down, reverse TKE, sliders, and adductor stretch.

## 2023-05-05 ENCOUNTER — OFFICE VISIT (OUTPATIENT)
Dept: ORTHOPEDIC SURGERY | Age: 63
End: 2023-05-05

## 2023-05-05 DIAGNOSIS — M17.12 PRIMARY OSTEOARTHRITIS OF LEFT KNEE: Primary | ICD-10-CM

## 2023-05-05 DIAGNOSIS — M25.562 LEFT KNEE PAIN, UNSPECIFIED CHRONICITY: ICD-10-CM
